# Patient Record
Sex: MALE | Race: WHITE
[De-identification: names, ages, dates, MRNs, and addresses within clinical notes are randomized per-mention and may not be internally consistent; named-entity substitution may affect disease eponyms.]

---

## 2017-01-05 ENCOUNTER — HOSPITAL ENCOUNTER (OUTPATIENT)
Dept: HOSPITAL 53 - M SFHCCAPE | Age: 52
End: 2017-01-05
Attending: PHYSICIAN ASSISTANT
Payer: COMMERCIAL

## 2017-01-05 DIAGNOSIS — J02.9: Primary | ICD-10-CM

## 2017-01-23 ENCOUNTER — HOSPITAL ENCOUNTER (OUTPATIENT)
Dept: HOSPITAL 53 - M SFHCCAPE | Age: 52
End: 2017-01-23
Attending: PHYSICIAN ASSISTANT
Payer: COMMERCIAL
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
## 2017-01-23 DIAGNOSIS — R07.9: Primary | ICD-10-CM

## 2017-01-23 LAB
ALBUMIN SERPL BCG-MCNC: 4.5 GM/DL (ref 3.2–5.2)
ALBUMIN/GLOB SERPL: 1.45 {RATIO} (ref 1–1.93)
ALP SERPL-CCNC: 68 U/L (ref 45–117)
ALT SERPL W P-5'-P-CCNC: 45 U/L (ref 12–78)
ANION GAP SERPL CALC-SCNC: 7 MEQ/L (ref 8–16)
AST SERPL-CCNC: 14 U/L (ref 15–37)
BASOPHILS # BLD AUTO: 0.2 K/MM3 (ref 0–0.2)
BASOPHILS NFR BLD AUTO: 1.6 % (ref 0–1)
BILIRUB SERPL-MCNC: 1 MG/DL (ref 0.2–1)
BUN SERPL-MCNC: 13 MG/DL (ref 7–18)
CALCIUM SERPL-MCNC: 9.4 MG/DL (ref 8.5–10.1)
CHLORIDE SERPL-SCNC: 105 MEQ/L (ref 98–107)
CO2 SERPL-SCNC: 28 MEQ/L (ref 21–32)
CREAT SERPL-MCNC: 1.01 MG/DL (ref 0.7–1.3)
EOSINOPHIL # BLD AUTO: 0.8 K/MM3 (ref 0–0.5)
EOSINOPHIL NFR BLD AUTO: 7.9 % (ref 0–3)
ERYTHROCYTE [DISTWIDTH] IN BLOOD BY AUTOMATED COUNT: 13.4 % (ref 11.5–14.5)
GFR SERPL CREATININE-BSD FRML MDRD: > 60 ML/MIN/{1.73_M2} (ref 56–?)
GLUCOSE SERPL-MCNC: 115 MG/DL (ref 70–105)
LARGE UNSTAINED CELL #: 0.2 K/MM3 (ref 0–0.4)
LARGE UNSTAINED CELL %: 1.9 % (ref 0–4)
LYMPHOCYTES # BLD AUTO: 2.6 K/MM3 (ref 1.5–4.5)
LYMPHOCYTES NFR BLD AUTO: 25.6 % (ref 24–44)
MCH RBC QN AUTO: 32.5 PG (ref 27–33)
MCHC RBC AUTO-ENTMCNC: 35.3 G/DL (ref 32–36.5)
MCV RBC AUTO: 92.2 FL (ref 80–96)
MONOCYTES # BLD AUTO: 0.4 K/MM3 (ref 0–0.8)
MONOCYTES NFR BLD AUTO: 4.5 % (ref 0–5)
NEUTROPHILS # BLD AUTO: 5.6 K/MM3 (ref 1.8–7.7)
NEUTROPHILS NFR BLD AUTO: 58.5 % (ref 36–66)
PLATELET # BLD AUTO: 241 K/MM3 (ref 150–450)
POTASSIUM SERPL-SCNC: 4.4 MEQ/L (ref 3.5–5.1)
PROT SERPL-MCNC: 7.6 GM/DL (ref 6.4–8.2)
SODIUM SERPL-SCNC: 140 MEQ/L (ref 136–145)
WBC # BLD AUTO: 9.5 K/MM3 (ref 4–10)

## 2017-01-26 LAB
B BURGDOR IGG+IGM SER-ACNC: <0.91 ISR (ref 0–0.9)
B BURGDOR IGM SER IA-ACNC: <0.8 INDEX (ref 0–0.79)

## 2017-01-30 ENCOUNTER — HOSPITAL ENCOUNTER (OUTPATIENT)
Dept: HOSPITAL 53 - M SFHCCAPE | Age: 52
End: 2017-01-30
Attending: PHYSICIAN ASSISTANT
Payer: COMMERCIAL

## 2017-01-30 DIAGNOSIS — M25.50: Primary | ICD-10-CM

## 2017-01-30 LAB — MAGNESIUM SERPL-MCNC: 1.9 MG/DL (ref 1.8–2.4)

## 2017-01-31 LAB
CONTROL LINE: (no result)
HIV 1+2 AB+HIV1 P24 AG SERPL QL IA: NEGATIVE
HIV 1+2 AB+HIV1 P24 AG SERPL QL IA: NEGATIVE
VIT B12 SERPL-MCNC: 397 PG/ML (ref 247–911)

## 2017-02-01 ENCOUNTER — HOSPITAL ENCOUNTER (OUTPATIENT)
Dept: HOSPITAL 53 - M SLEEP HO | Age: 52
End: 2017-02-01
Attending: NURSE PRACTITIONER
Payer: COMMERCIAL

## 2017-02-01 DIAGNOSIS — G47.30: Primary | ICD-10-CM

## 2017-02-02 NOTE — SLEEPHOME
DATE OF PROCEDURE:  02/01/2017

 

ORDERED BY:  Deanna Peñaloza NP

 

Diagnostic home sleep testing was performed due to concern for the obstructive

sleep apnea syndrome in this patient with snoring and excessive somnolence.

 

For testing, a NOX-T3 respiratory monitoring device was used.  Continuous record

was made of pulse, oxygen saturation, chest and abdominal strain, airflow and

body position.

 

9 hours and 59 minutes of data were reviewed.  7 hours and 51 minutes were marked

as time in bed.  During the interval marked time in bed, there were 192

respiratory events identified of 10 seconds in duration or greater for a

respiratory event index of 24.4.  The events were primarily obstructive, but

central and mixed apneas were also noted.  The patient's baseline pulse rate was

82 beats per minute.  Pulse rate ranged 67 to 113.  Baseline oxygen saturation

93.8%.  Lowest oxygen saturation 83%.  Testing was performed in both the supine

and non supine positions.

 

IMPRESSION:

Abnormal home sleep testing with repetitive respiratory events and oxygen

desaturations to 83% with a respiratory event index of 24.4, is consistent with

the obstructive sleep apnea syndrome.

 

RECOMMENDATION:  The patient should be referred for a formal sleep evaluation and

in laboratory pressure titration.

 

 

 

 

 

 

cc:    *REBECCA Escalante

## 2017-02-23 ENCOUNTER — HOSPITAL ENCOUNTER (OUTPATIENT)
Dept: HOSPITAL 53 - M SFHCCAPE | Age: 52
End: 2017-02-23
Attending: PHYSICIAN ASSISTANT
Payer: COMMERCIAL

## 2017-02-23 DIAGNOSIS — E11.9: Primary | ICD-10-CM

## 2017-02-23 LAB
ALBUMIN SERPL BCG-MCNC: 4.3 GM/DL (ref 3.2–5.2)
ALBUMIN/GLOB SERPL: 1.48 {RATIO} (ref 1–1.93)
ALP SERPL-CCNC: 71 U/L (ref 45–117)
ALT SERPL W P-5'-P-CCNC: 57 U/L (ref 12–78)
ANION GAP SERPL CALC-SCNC: 10 MEQ/L (ref 8–16)
AST SERPL-CCNC: 20 U/L (ref 15–37)
BILIRUB SERPL-MCNC: 1 MG/DL (ref 0.2–1)
BUN SERPL-MCNC: 14 MG/DL (ref 7–18)
CALCIUM SERPL-MCNC: 9.3 MG/DL (ref 8.5–10.1)
CHLORIDE SERPL-SCNC: 99 MEQ/L (ref 98–107)
CO2 SERPL-SCNC: 28 MEQ/L (ref 21–32)
CREAT SERPL-MCNC: 1.15 MG/DL (ref 0.7–1.3)
EST. AVERAGE GLUCOSE BLD GHB EST-MCNC: 180 MG/DL (ref 60–110)
GFR SERPL CREATININE-BSD FRML MDRD: > 60 ML/MIN/{1.73_M2} (ref 56–?)
GLUCOSE SERPL-MCNC: 221 MG/DL (ref 70–105)
POTASSIUM SERPL-SCNC: 4.5 MEQ/L (ref 3.5–5.1)
PROT SERPL-MCNC: 7.2 GM/DL (ref 6.4–8.2)
SODIUM SERPL-SCNC: 137 MEQ/L (ref 136–145)

## 2017-07-18 ENCOUNTER — HOSPITAL ENCOUNTER (OUTPATIENT)
Dept: HOSPITAL 53 - M SFHCCAPE | Age: 52
End: 2017-07-18
Attending: PHYSICIAN ASSISTANT
Payer: COMMERCIAL

## 2017-07-18 DIAGNOSIS — E55.9: Primary | ICD-10-CM

## 2017-07-18 DIAGNOSIS — E11.9: ICD-10-CM

## 2017-07-18 LAB
ALBUMIN SERPL BCG-MCNC: 4 GM/DL (ref 3.2–5.2)
ALBUMIN/GLOB SERPL: 1.6 {RATIO} (ref 1–1.93)
ALP SERPL-CCNC: 74 U/L (ref 45–117)
ALT SERPL W P-5'-P-CCNC: 44 U/L (ref 12–78)
ANION GAP SERPL CALC-SCNC: 8 MEQ/L (ref 8–16)
AST SERPL-CCNC: 15 U/L (ref 15–37)
BASOPHILS # BLD AUTO: 0 K/MM3 (ref 0–0.2)
BASOPHILS NFR BLD AUTO: 0.9 % (ref 0–1)
BILIRUB SERPL-MCNC: 0.8 MG/DL (ref 0.2–1)
BUN SERPL-MCNC: 21 MG/DL (ref 7–18)
CALCIUM SERPL-MCNC: 8.7 MG/DL (ref 8.5–10.1)
CHLORIDE SERPL-SCNC: 102 MEQ/L (ref 98–107)
CO2 SERPL-SCNC: 26 MEQ/L (ref 21–32)
CREAT SERPL-MCNC: 1.02 MG/DL (ref 0.7–1.3)
EOSINOPHIL # BLD AUTO: 0.2 K/MM3 (ref 0–0.5)
EOSINOPHIL NFR BLD AUTO: 4 % (ref 0–3)
ERYTHROCYTE [DISTWIDTH] IN BLOOD BY AUTOMATED COUNT: 13 % (ref 11.5–14.5)
EST. AVERAGE GLUCOSE BLD GHB EST-MCNC: 209 MG/DL (ref 60–110)
GFR SERPL CREATININE-BSD FRML MDRD: > 60 ML/MIN/{1.73_M2} (ref 56–?)
GLUCOSE SERPL-MCNC: 244 MG/DL (ref 70–105)
LARGE UNSTAINED CELL #: 0.1 K/MM3 (ref 0–0.4)
LARGE UNSTAINED CELL %: 1.5 % (ref 0–4)
LYMPHOCYTES # BLD AUTO: 2 K/MM3 (ref 1.5–4.5)
LYMPHOCYTES NFR BLD AUTO: 34.2 % (ref 24–44)
MCH RBC QN AUTO: 32.2 PG (ref 27–33)
MCHC RBC AUTO-ENTMCNC: 34.4 G/DL (ref 32–36.5)
MCV RBC AUTO: 93.8 FL (ref 80–96)
MONOCYTES # BLD AUTO: 0.3 K/MM3 (ref 0–0.8)
MONOCYTES NFR BLD AUTO: 6 % (ref 0–5)
NEUTROPHILS # BLD AUTO: 2.9 K/MM3 (ref 1.8–7.7)
NEUTROPHILS NFR BLD AUTO: 53.4 % (ref 36–66)
PLATELET # BLD AUTO: 212 K/MM3 (ref 150–450)
POTASSIUM SERPL-SCNC: 4.5 MEQ/L (ref 3.5–5.1)
PROT SERPL-MCNC: 6.5 GM/DL (ref 6.4–8.2)
SODIUM SERPL-SCNC: 136 MEQ/L (ref 136–145)
WBC # BLD AUTO: 5.5 K/MM3 (ref 4–10)

## 2017-10-25 ENCOUNTER — HOSPITAL ENCOUNTER (OUTPATIENT)
Dept: HOSPITAL 53 - M SFHCCAPE | Age: 52
End: 2017-10-25
Attending: PHYSICIAN ASSISTANT
Payer: COMMERCIAL

## 2017-10-25 DIAGNOSIS — E11.9: Primary | ICD-10-CM

## 2017-10-25 DIAGNOSIS — E55.9: ICD-10-CM

## 2017-10-25 LAB
ALBUMIN SERPL BCG-MCNC: 4.1 GM/DL (ref 3.2–5.2)
ALBUMIN/GLOB SERPL: 1.37 {RATIO} (ref 1–1.93)
ALP SERPL-CCNC: 80 U/L (ref 45–117)
ALT SERPL W P-5'-P-CCNC: 52 U/L (ref 12–78)
ANION GAP SERPL CALC-SCNC: 7 MEQ/L (ref 8–16)
AST SERPL-CCNC: 14 U/L (ref 15–37)
BASOPHILS # BLD AUTO: 0.1 10^3/UL (ref 0–0.2)
BASOPHILS NFR BLD AUTO: 1.2 % (ref 0–1)
BILIRUB SERPL-MCNC: 0.6 MG/DL (ref 0.2–1)
BUN SERPL-MCNC: 15 MG/DL (ref 7–18)
CALCIUM SERPL-MCNC: 9 MG/DL (ref 8.5–10.1)
CHLORIDE SERPL-SCNC: 102 MEQ/L (ref 98–107)
CHOLEST SERPL-MCNC: 159 MG/DL (ref ?–200)
CO2 SERPL-SCNC: 29 MEQ/L (ref 21–32)
CREAT SERPL-MCNC: 1.02 MG/DL (ref 0.7–1.3)
EOSINOPHIL # BLD AUTO: 0.3 10^3/UL (ref 0–0.5)
EOSINOPHIL NFR BLD AUTO: 4.3 % (ref 0–3)
ERYTHROCYTE [DISTWIDTH] IN BLOOD BY AUTOMATED COUNT: 11.9 % (ref 11.5–14.5)
EST. AVERAGE GLUCOSE BLD GHB EST-MCNC: 209 MG/DL (ref 60–110)
GFR SERPL CREATININE-BSD FRML MDRD: > 60 ML/MIN/{1.73_M2} (ref 56–?)
GLUCOSE SERPL-MCNC: 246 MG/DL (ref 70–105)
IMM GRANULOCYTES NFR BLD: 0.9 % (ref 0–0)
LYMPHOCYTES # BLD AUTO: 1.6 10^3/UL (ref 1.5–4.5)
LYMPHOCYTES NFR BLD AUTO: 27.5 % (ref 24–44)
MCH RBC QN AUTO: 32 PG (ref 27–33)
MCHC RBC AUTO-ENTMCNC: 34.3 G/DL (ref 32–36.5)
MCV RBC AUTO: 93.3 FL (ref 80–96)
MONOCYTES # BLD AUTO: 0.4 10^3/UL (ref 0–0.8)
MONOCYTES NFR BLD AUTO: 6.6 % (ref 0–5)
NEUTROPHILS # BLD AUTO: 3.4 10^3/UL (ref 1.8–7.7)
NEUTROPHILS NFR BLD AUTO: 59.5 % (ref 36–66)
NRBC BLD AUTO-RTO: 0 % (ref 0–0)
PLATELET # BLD AUTO: 213 10^3/UL (ref 150–450)
POTASSIUM SERPL-SCNC: 4.2 MEQ/L (ref 3.5–5.1)
PROT SERPL-MCNC: 7.1 GM/DL (ref 6.4–8.2)
SODIUM SERPL-SCNC: 138 MEQ/L (ref 136–145)
TRIGL SERPL-MCNC: 350 MG/DL (ref ?–150)
WBC # BLD AUTO: 5.8 10^3/UL (ref 4–10)

## 2018-01-24 ENCOUNTER — HOSPITAL ENCOUNTER (OUTPATIENT)
Dept: HOSPITAL 53 - M SFHCCAPE | Age: 53
End: 2018-01-24
Attending: PHYSICIAN ASSISTANT
Payer: COMMERCIAL

## 2018-01-24 DIAGNOSIS — I10: Primary | ICD-10-CM

## 2018-01-24 DIAGNOSIS — E11.69: ICD-10-CM

## 2018-01-24 DIAGNOSIS — E78.2: ICD-10-CM

## 2018-01-24 LAB
ALBUMIN/GLOBULIN RATIO: 1.54 (ref 1–1.93)
ALBUMIN: 4.3 GM/DL (ref 3.2–5.2)
ALKALINE PHOSPHATASE: 65 U/L (ref 45–117)
ALT SERPL W P-5'-P-CCNC: 45 U/L (ref 12–78)
ANION GAP: 7 MEQ/L (ref 8–16)
AST SERPL-CCNC: 17 U/L (ref 7–37)
BASO #: 0.1 10^3/UL (ref 0–0.2)
BASO %: 1.2 % (ref 0–1)
BILIRUBIN,TOTAL: 0.8 MG/DL (ref 0.2–1)
BLOOD UREA NITROGEN: 15 MG/DL (ref 7–18)
CALCIUM LEVEL: 8.7 MG/DL (ref 8.5–10.1)
CARBON DIOXIDE LEVEL: 28 MEQ/L (ref 21–32)
CHLORIDE LEVEL: 103 MEQ/L (ref 98–107)
CHOLEST SERPL-MCNC: 145 MG/DL (ref ?–200)
CHOLESTEROL RISK RATIO: 4.53 (ref ?–5)
CREATININE FOR GFR: 1.06 MG/DL (ref 0.7–1.3)
EOS #: 0.4 10^3/UL (ref 0–0.5)
EOSINOPHIL NFR BLD AUTO: 6.6 % (ref 0–3)
EST. AVERAGE GLUCOSE BLD GHB EST-MCNC: 220 MG/DL (ref 60–110)
FREE T4: 0.85 NG/DL (ref 0.76–1.46)
GFR SERPL CREATININE-BSD FRML MDRD: > 60 ML/MIN/{1.73_M2} (ref 56–?)
GLUCOSE, FASTING: 286 MG/DL (ref 70–100)
HDLC SERPL-MCNC: 32 MG/DL (ref 40–?)
HEMATOCRIT: 40.7 % (ref 42–52)
HEMOGLOBIN: 13.9 G/DL (ref 14–18)
IMMATURE GRANULOCYTE #: 0 10^3/UL (ref 0–0)
IMMATURE GRANULOCYTE %: 0.3 % (ref 0–0)
LDL CHOLESTEROL: 54.2 MG/DL (ref ?–100)
LYMPH #: 1.6 10^3/UL (ref 1.5–4.5)
LYMPH %: 25.7 % (ref 24–44)
MEAN CORPUSCULAR HEMOGLOBIN: 31.7 PG (ref 27–33)
MEAN CORPUSCULAR HGB CONC: 34.2 G/DL (ref 32–36.5)
MEAN CORPUSCULAR VOLUME: 92.9 FL (ref 80–96)
MONO #: 0.3 10^3/UL (ref 0–0.8)
MONO %: 5.6 % (ref 0–5)
NEUTROPHILS #: 3.7 10^3/UL (ref 1.8–7.7)
NEUTROPHILS %: 60.6 % (ref 36–66)
NONHDLC SERPL-MCNC: 113 MG/DL
NRBC BLD AUTO-RTO: 0 % (ref 0–0)
PLATELET COUNT, AUTOMATED: 231 10^3/UL (ref 150–450)
POTASSIUM SERUM: 4.3 MEQ/L (ref 3.5–5.1)
RED BLOOD COUNT: 4.38 10^6/UL (ref 4.3–6.1)
RED CELL DISTRIBUTION WIDTH: 12.3 % (ref 11.5–14.5)
SODIUM LEVEL: 138 MEQ/L (ref 136–145)
THYROID STIMULATING HORMONE: 1.33 UIU/ML (ref 0.36–3.74)
TOTAL PROTEIN: 7.1 GM/DL (ref 6.4–8.2)
TRIGLYCERIDES LEVEL: 294 MG/DL (ref ?–150)
WHITE BLOOD COUNT: 6 10^3/UL (ref 4–10)

## 2018-01-24 PROCEDURE — 84443 ASSAY THYROID STIM HORMONE: CPT

## 2018-05-30 ENCOUNTER — HOSPITAL ENCOUNTER (OUTPATIENT)
Dept: HOSPITAL 53 - M SFHCCAPE | Age: 53
End: 2018-05-30
Attending: PHYSICIAN ASSISTANT
Payer: COMMERCIAL

## 2018-05-30 DIAGNOSIS — E11.69: Primary | ICD-10-CM

## 2018-05-30 LAB
ALBUMIN/GLOBULIN RATIO: 1.54 (ref 1–1.93)
ALBUMIN: 4.3 GM/DL (ref 3.2–5.2)
ALKALINE PHOSPHATASE: 66 U/L (ref 45–117)
ALT SERPL W P-5'-P-CCNC: 55 U/L (ref 12–78)
ANION GAP: 8 MEQ/L (ref 8–16)
AST SERPL-CCNC: 22 U/L (ref 7–37)
BILIRUBIN,TOTAL: 0.7 MG/DL (ref 0.2–1)
BLOOD UREA NITROGEN: 22 MG/DL (ref 7–18)
CALCIUM LEVEL: 9.1 MG/DL (ref 8.5–10.1)
CARBON DIOXIDE LEVEL: 26 MEQ/L (ref 21–32)
CHLORIDE LEVEL: 104 MEQ/L (ref 98–107)
CREATININE FOR GFR: 1.35 MG/DL (ref 0.7–1.3)
EST. AVERAGE GLUCOSE BLD GHB EST-MCNC: 246 MG/DL (ref 60–110)
GFR SERPL CREATININE-BSD FRML MDRD: 59.1 ML/MIN/{1.73_M2} (ref 56–?)
GLUCOSE, FASTING: 258 MG/DL (ref 70–100)
POTASSIUM SERUM: 4.5 MEQ/L (ref 3.5–5.1)
SODIUM LEVEL: 138 MEQ/L (ref 136–145)
TOTAL PROTEIN: 7.1 GM/DL (ref 6.4–8.2)

## 2018-09-20 ENCOUNTER — HOSPITAL ENCOUNTER (OUTPATIENT)
Dept: HOSPITAL 53 - M SFHCCAPE | Age: 53
End: 2018-09-20
Attending: PHYSICIAN ASSISTANT
Payer: COMMERCIAL

## 2018-09-20 DIAGNOSIS — I10: Primary | ICD-10-CM

## 2018-09-20 DIAGNOSIS — E55.9: ICD-10-CM

## 2018-09-20 DIAGNOSIS — E11.65: ICD-10-CM

## 2018-09-20 DIAGNOSIS — Z12.5: ICD-10-CM

## 2018-09-20 LAB
25(OH)D3 SERPL-MCNC: 48.8 NG/ML (ref 30–100)
ALBUMIN/GLOBULIN RATIO: 1.44 (ref 1–1.93)
ALBUMIN: 4.6 GM/DL (ref 3.2–5.2)
ALKALINE PHOSPHATASE: 69 U/L (ref 45–117)
ALT SERPL W P-5'-P-CCNC: 42 U/L (ref 12–78)
ANION GAP: 9 MEQ/L (ref 8–16)
AST SERPL-CCNC: 18 U/L (ref 7–37)
BASO #: 0.1 10^3/UL (ref 0–0.2)
BASO %: 1.1 % (ref 0–1)
BILIRUBIN,TOTAL: 0.8 MG/DL (ref 0.2–1)
BLOOD UREA NITROGEN: 20 MG/DL (ref 7–18)
CALCIUM LEVEL: 9.6 MG/DL (ref 8.5–10.1)
CARBON DIOXIDE LEVEL: 26 MEQ/L (ref 21–32)
CHLORIDE LEVEL: 102 MEQ/L (ref 98–107)
CHOLEST SERPL-MCNC: 182 MG/DL (ref ?–200)
CHOLESTEROL RISK RATIO: 4.67 (ref ?–5)
CREAT UR-MCNC: 205 MG/DL
CREATININE FOR GFR: 1.1 MG/DL (ref 0.7–1.3)
EOS #: 0.2 10^3/UL (ref 0–0.5)
EOSINOPHIL NFR BLD AUTO: 3.4 % (ref 0–3)
EST. AVERAGE GLUCOSE BLD GHB EST-MCNC: 189 MG/DL (ref 60–110)
GFR SERPL CREATININE-BSD FRML MDRD: > 60 ML/MIN/{1.73_M2} (ref 56–?)
GLUCOSE, FASTING: 249 MG/DL (ref 70–100)
HDLC SERPL-MCNC: 39 MG/DL (ref 40–?)
HEMATOCRIT: 44.3 % (ref 42–52)
HEMOGLOBIN: 14.9 G/DL (ref 13.5–17.5)
IMMATURE GRANULOCYTE %: 0.5 % (ref 0–3)
LDL CHOLESTEROL: 92 MG/DL (ref ?–100)
LYMPH #: 1.6 10^3/UL (ref 1.5–4.5)
LYMPH %: 26 % (ref 24–44)
MEAN CORPUSCULAR HEMOGLOBIN: 31.6 PG (ref 27–33)
MEAN CORPUSCULAR HGB CONC: 33.6 G/DL (ref 32–36.5)
MEAN CORPUSCULAR VOLUME: 93.9 FL (ref 80–96)
MICROALBUMIN UR-MCNC: 44.6 MG/L
MICROALBUMIN/CREAT UR: 21.8 MCG/MG (ref 0–30)
MONO #: 0.4 10^3/UL (ref 0–0.8)
MONO %: 6.4 % (ref 0–5)
NEUTROPHILS #: 3.8 10^3/UL (ref 1.8–7.7)
NEUTROPHILS %: 62.6 % (ref 36–66)
NONHDLC SERPL-MCNC: 143 MG/DL
NRBC BLD AUTO-RTO: 0 % (ref 0–0)
PLATELET COUNT, AUTOMATED: 232 10^3/UL (ref 150–450)
POTASSIUM SERUM: 4.7 MEQ/L (ref 3.5–5.1)
PSA SERPL-MCNC: 0.44 NG/ML (ref ?–4)
RED BLOOD COUNT: 4.72 10^6/UL (ref 4.3–6.1)
RED CELL DISTRIBUTION WIDTH: 11.9 % (ref 11.5–14.5)
SODIUM LEVEL: 137 MEQ/L (ref 136–145)
TOTAL PROTEIN: 7.8 GM/DL (ref 6.4–8.2)
TRIGLYCERIDES LEVEL: 253 MG/DL (ref ?–150)
WHITE BLOOD COUNT: 6.1 10^3/UL (ref 4–10)

## 2018-11-12 ENCOUNTER — HOSPITAL ENCOUNTER (OUTPATIENT)
Dept: HOSPITAL 53 - M LAB REF | Age: 53
End: 2018-11-12
Attending: DERMATOLOGY
Payer: COMMERCIAL

## 2018-11-12 DIAGNOSIS — C44.02: Primary | ICD-10-CM

## 2018-11-12 PROCEDURE — 88305 TISSUE EXAM BY PATHOLOGIST: CPT

## 2018-12-11 ENCOUNTER — HOSPITAL ENCOUNTER (OUTPATIENT)
Dept: HOSPITAL 53 - M SFHCCAPE | Age: 53
End: 2018-12-11
Attending: PHYSICIAN ASSISTANT
Payer: COMMERCIAL

## 2018-12-11 DIAGNOSIS — E11.9: ICD-10-CM

## 2018-12-11 DIAGNOSIS — I10: Primary | ICD-10-CM

## 2018-12-11 LAB
ALBUMIN/GLOBULIN RATIO: 1.39 (ref 1–1.93)
ALBUMIN: 4.3 GM/DL (ref 3.2–5.2)
ALKALINE PHOSPHATASE: 79 U/L (ref 45–117)
ALT SERPL W P-5'-P-CCNC: 44 U/L (ref 12–78)
ANION GAP: 9 MEQ/L (ref 8–16)
AST SERPL-CCNC: 19 U/L (ref 7–37)
BASO #: 0.1 10^3/UL (ref 0–0.2)
BASO %: 1.3 % (ref 0–1)
BILIRUBIN,TOTAL: 1.2 MG/DL (ref 0.2–1)
BLOOD UREA NITROGEN: 18 MG/DL (ref 7–18)
CALCIUM LEVEL: 9.2 MG/DL (ref 8.5–10.1)
CARBON DIOXIDE LEVEL: 27 MEQ/L (ref 21–32)
CHLORIDE LEVEL: 100 MEQ/L (ref 98–107)
CREATININE FOR GFR: 1.11 MG/DL (ref 0.7–1.3)
EOS #: 0.6 10^3/UL (ref 0–0.5)
EOSINOPHIL NFR BLD AUTO: 8.3 % (ref 0–3)
EST. AVERAGE GLUCOSE BLD GHB EST-MCNC: 232 MG/DL (ref 60–110)
GFR SERPL CREATININE-BSD FRML MDRD: > 60 ML/MIN/{1.73_M2} (ref 56–?)
GLUCOSE, FASTING: 199 MG/DL (ref 70–100)
HEMATOCRIT: 43.8 % (ref 42–52)
HEMOGLOBIN: 14.8 G/DL (ref 13.5–17.5)
IMMATURE GRANULOCYTE %: 0.3 % (ref 0–3)
LYMPH #: 2.2 10^3/UL (ref 1.5–4.5)
LYMPH %: 28.5 % (ref 24–44)
MEAN CORPUSCULAR HEMOGLOBIN: 31.8 PG (ref 27–33)
MEAN CORPUSCULAR HGB CONC: 33.8 G/DL (ref 32–36.5)
MEAN CORPUSCULAR VOLUME: 94 FL (ref 80–96)
MONO #: 0.5 10^3/UL (ref 0–0.8)
MONO %: 6.5 % (ref 0–5)
NEUTROPHILS #: 4.3 10^3/UL (ref 1.8–7.7)
NEUTROPHILS %: 55.1 % (ref 36–66)
NRBC BLD AUTO-RTO: 0 % (ref 0–0)
PLATELET COUNT, AUTOMATED: 251 10^3/UL (ref 150–450)
POTASSIUM SERUM: 4.4 MEQ/L (ref 3.5–5.1)
RED BLOOD COUNT: 4.66 10^6/UL (ref 4.3–6.1)
RED CELL DISTRIBUTION WIDTH: 12.3 % (ref 11.5–14.5)
SODIUM LEVEL: 136 MEQ/L (ref 136–145)
THYROID STIMULATING HORMONE: 2.04 UIU/ML (ref 0.36–3.74)
TOTAL PROTEIN: 7.4 GM/DL (ref 6.4–8.2)
WHITE BLOOD COUNT: 7.7 10^3/UL (ref 4–10)

## 2018-12-12 ENCOUNTER — HOSPITAL ENCOUNTER (OUTPATIENT)
Dept: HOSPITAL 53 - M RAD | Age: 53
End: 2018-12-12
Attending: PHYSICIAN ASSISTANT
Payer: COMMERCIAL

## 2018-12-12 DIAGNOSIS — I10: ICD-10-CM

## 2018-12-12 DIAGNOSIS — M51.34: ICD-10-CM

## 2018-12-12 DIAGNOSIS — Z01.818: Primary | ICD-10-CM

## 2018-12-12 DIAGNOSIS — M25.731: ICD-10-CM

## 2018-12-12 DIAGNOSIS — C44.92: ICD-10-CM

## 2018-12-12 DIAGNOSIS — M25.531: ICD-10-CM

## 2018-12-12 PROCEDURE — 71046 X-RAY EXAM CHEST 2 VIEWS: CPT

## 2018-12-18 RX ADMIN — Medication 1 EA: at 11:29

## 2018-12-19 ENCOUNTER — HOSPITAL ENCOUNTER (OUTPATIENT)
Dept: HOSPITAL 53 - M SDC | Age: 53
Discharge: HOME | End: 2018-12-19
Attending: PLASTIC SURGERY
Payer: COMMERCIAL

## 2018-12-19 DIAGNOSIS — Z79.84: ICD-10-CM

## 2018-12-19 DIAGNOSIS — Z79.4: ICD-10-CM

## 2018-12-19 DIAGNOSIS — Z88.8: ICD-10-CM

## 2018-12-19 DIAGNOSIS — G47.33: ICD-10-CM

## 2018-12-19 DIAGNOSIS — E78.00: ICD-10-CM

## 2018-12-19 DIAGNOSIS — I10: ICD-10-CM

## 2018-12-19 DIAGNOSIS — M12.9: ICD-10-CM

## 2018-12-19 DIAGNOSIS — Z79.899: ICD-10-CM

## 2018-12-19 DIAGNOSIS — E11.9: ICD-10-CM

## 2018-12-19 DIAGNOSIS — R06.83: ICD-10-CM

## 2018-12-19 DIAGNOSIS — Z79.82: ICD-10-CM

## 2018-12-19 DIAGNOSIS — F41.9: ICD-10-CM

## 2018-12-19 DIAGNOSIS — C44.329: Primary | ICD-10-CM

## 2018-12-19 LAB — GLUCOSE BLDC GLUCOMTR-MCNC: 312 MG/DL (ref 70–105)

## 2018-12-19 PROCEDURE — 11642 EXC F/E/E/N/L MAL+MRG 1.1-2: CPT

## 2018-12-19 RX ADMIN — CEFAZOLIN SODIUM 1 GM: 1 INJECTION, POWDER, FOR SOLUTION INTRAMUSCULAR; INTRAVENOUS at 12:07

## 2018-12-19 RX ADMIN — BACITRACIN 1 DOSE: 500 OINTMENT TOPICAL at 12:06

## 2018-12-19 RX ADMIN — INSULIN LISPRO 10 UNITS: 100 INJECTION, SOLUTION INTRAVENOUS; SUBCUTANEOUS at 11:03

## 2018-12-19 RX ADMIN — CEFAZOLIN SODIUM 1 MLS/HR: 1 INJECTION, POWDER, FOR SOLUTION INTRAMUSCULAR; INTRAVENOUS at 11:28

## 2018-12-19 RX ADMIN — ACETAMINOPHEN 1 MG: 325 TABLET ORAL at 13:10

## 2018-12-19 RX ADMIN — SODIUM CHLORIDE, POTASSIUM CHLORIDE, SODIUM LACTATE AND CALCIUM CHLORIDE 1 MLS/HR: 600; 310; 30; 20 INJECTION, SOLUTION INTRAVENOUS at 10:30

## 2018-12-19 RX ADMIN — LIDOCAINE HYDROCHLORIDE AND EPINEPHRINE 1 ML: 20; 5 INJECTION, SOLUTION EPIDURAL; INFILTRATION; INTRACAUDAL; PERINEURAL at 11:49

## 2019-02-23 ENCOUNTER — HOSPITAL ENCOUNTER (OUTPATIENT)
Dept: HOSPITAL 53 - M SLEEP | Age: 54
End: 2019-02-23
Attending: SPECIALIST
Payer: COMMERCIAL

## 2019-02-23 DIAGNOSIS — G47.33: Primary | ICD-10-CM

## 2019-02-25 NOTE — SLEEPCENT
DATE OF PROCEDURE:  02/23/2019

 

ORDERED BY:  Dr. Doyle

 

Nocturnal polysomnography was performed for the titration of pressure therapy in

this patient with a clinical diagnosis of obstructive sleep apnea syndrome

confirmed by home testing revealing a respiratory event index of 24.4.

 

For testing, a [employer] [place] full face mask of medium size was used, 5

cm of water pressure were applied to the circuit and the lights were

extinguished.

 

7 hours and 35 minutes of data were reviewed.  There were 357 minutes of sleep

identified.  Sleep latency was normal at 10.5 minutes.  REM latency was mildly

delayed at 108 minutes.  Sleep architecture was fair to good with three REM

cycles noted.  Overall sleep efficiency was 80%.  The patient's electrocardiogram

showed a sinus rhythm with an average heart rate of 75 beats per minute.  EEG

showed mild coarsening in background.  No focal events and normal waveforms for

awake and sleep.  Respiratory events were fully palliated with continuous

positive airway pressure (CPAP) at a pressure of +7.  There was some minor limb

activity noted.  Limb movement arousal index of 3.9.

 

IMPRESSION:

Obstructive sleep apnea syndrome (G47.33).

 

RECOMMENDATIONS:

Nightly use of pressure therapy 7 cm of water.

## 2019-04-25 ENCOUNTER — HOSPITAL ENCOUNTER (OUTPATIENT)
Dept: HOSPITAL 53 - M SFHCCAPE | Age: 54
End: 2019-04-25
Attending: PHYSICIAN ASSISTANT
Payer: COMMERCIAL

## 2019-04-25 DIAGNOSIS — E11.9: Primary | ICD-10-CM

## 2019-04-25 LAB
ALBUMIN SERPL BCG-MCNC: 4.4 GM/DL (ref 3.2–5.2)
ALT SERPL W P-5'-P-CCNC: 44 U/L (ref 12–78)
BASOPHILS # BLD AUTO: 0.1 10^3/UL (ref 0–0.2)
BASOPHILS NFR BLD AUTO: 1.1 % (ref 0–1)
BILIRUB SERPL-MCNC: 1.1 MG/DL (ref 0.2–1)
BUN SERPL-MCNC: 19 MG/DL (ref 7–18)
CALCIUM SERPL-MCNC: 9.1 MG/DL (ref 8.5–10.1)
CHLORIDE SERPL-SCNC: 102 MEQ/L (ref 98–107)
CHOLEST SERPL-MCNC: 142 MG/DL (ref ?–200)
CHOLEST/HDLC SERPL: 4.44 {RATIO} (ref ?–5)
CO2 SERPL-SCNC: 28 MEQ/L (ref 21–32)
CREAT SERPL-MCNC: 1.15 MG/DL (ref 0.7–1.3)
EOSINOPHIL # BLD AUTO: 0.3 10^3/UL (ref 0–0.5)
EOSINOPHIL NFR BLD AUTO: 4.6 % (ref 0–3)
EST. AVERAGE GLUCOSE BLD GHB EST-MCNC: 212 MG/DL (ref 60–110)
GFR SERPL CREATININE-BSD FRML MDRD: > 60 ML/MIN/{1.73_M2} (ref 56–?)
GLUCOSE SERPL-MCNC: 236 MG/DL (ref 70–100)
HCT VFR BLD AUTO: 42.7 % (ref 42–52)
HDLC SERPL-MCNC: 32 MG/DL (ref 40–?)
HGB BLD-MCNC: 14.2 G/DL (ref 13.5–17.5)
LDLC SERPL CALC-MCNC: 42.4 MG/DL (ref ?–100)
LYMPHOCYTES # BLD AUTO: 1.8 10^3/UL (ref 1.5–4.5)
LYMPHOCYTES NFR BLD AUTO: 28.3 % (ref 24–44)
MCH RBC QN AUTO: 30.9 PG (ref 27–33)
MCHC RBC AUTO-ENTMCNC: 33.3 G/DL (ref 32–36.5)
MCV RBC AUTO: 92.8 FL (ref 80–96)
MONOCYTES # BLD AUTO: 0.5 10^3/UL (ref 0–0.8)
MONOCYTES NFR BLD AUTO: 7.8 % (ref 0–5)
NEUTROPHILS # BLD AUTO: 3.6 10^3/UL (ref 1.8–7.7)
NEUTROPHILS NFR BLD AUTO: 57.6 % (ref 36–66)
NONHDLC SERPL-MCNC: 110 MG/DL
PLATELET # BLD AUTO: 231 10^3/UL (ref 150–450)
POTASSIUM SERPL-SCNC: 4.2 MEQ/L (ref 3.5–5.1)
PROT SERPL-MCNC: 6.8 GM/DL (ref 6.4–8.2)
RBC # BLD AUTO: 4.6 10^6/UL (ref 4.3–6.1)
SODIUM SERPL-SCNC: 137 MEQ/L (ref 136–145)
TRIGL SERPL-MCNC: 338 MG/DL (ref ?–150)
WBC # BLD AUTO: 6.3 10^3/UL (ref 4–10)

## 2019-04-29 ENCOUNTER — HOSPITAL ENCOUNTER (OUTPATIENT)
Dept: HOSPITAL 53 - M RAD | Age: 54
End: 2019-04-29
Attending: PHYSICIAN ASSISTANT
Payer: COMMERCIAL

## 2019-04-29 DIAGNOSIS — K59.00: Primary | ICD-10-CM

## 2019-04-29 DIAGNOSIS — R10.31: ICD-10-CM

## 2019-04-29 DIAGNOSIS — N28.1: ICD-10-CM

## 2019-04-29 DIAGNOSIS — K57.90: ICD-10-CM

## 2019-04-29 DIAGNOSIS — K76.0: ICD-10-CM

## 2019-04-29 PROCEDURE — 74177 CT ABD & PELVIS W/CONTRAST: CPT

## 2019-04-30 NOTE — REP
Clinical:  Constipation.

 

Technique:  Axial contrast enhanced images from the lung bases to the pubic

symphysis using oral (per protocol) and 100 ml Isovue 370 intravenous contrast

material with coronal and sagittal re-formations.

 

Comparison:  12/23/2014.

 

Findings:

Lung bases are clear.  Visualized heart and pericardium normal.

 

Mild fatty infiltration to the liver suggested without focal hepatic lesion.

Spleen, pancreas, gallbladder, bilateral adrenal glands and kidneys are normal /

stable.  1.7 cm left renal cyst slightly increased from prior examination.  The

enteric system including stomach, small, and large bowel is without obstruction

or acute inflammatory process.  Normal terminal ileum and appendix identified in

the right lower quadrant.  Few scattered colonic diverticula noted without acute

diverticulitis.  Pelvis demonstrates normal bladder and age appropriate

prostate/seminal vesicles.  Small fat containing inguinal hernia identified.  No

ascites.  No free air.  No adenopathy.  Abdominal aorta without aneurysm or

dissection.  Musculoskeletal structures without focal osseous abnormality.

 

Impression:

1.  Complex appearing 1.7 cm left renal cyst mildly increased from prior

examination may warrant ultrasound follow-up.

2.  Mild hepatosteatosis.

3.  Few scattered colonic diverticula without acute diverticulitis.

4.  No further acute abdominopelvic pathology appreciated.

 

 

Electronically Signed by

Clemente Trinidad MD 04/30/2019 05:49 A

## 2019-06-10 ENCOUNTER — HOSPITAL ENCOUNTER (EMERGENCY)
Dept: HOSPITAL 53 - M ED | Age: 54
Discharge: LEFT BEFORE DISCHARGE | End: 2019-06-10
Payer: COMMERCIAL

## 2019-06-10 VITALS — BODY MASS INDEX: 34.22 KG/M2 | HEIGHT: 64 IN | WEIGHT: 200.42 LBS

## 2019-06-10 VITALS — DIASTOLIC BLOOD PRESSURE: 74 MMHG | SYSTOLIC BLOOD PRESSURE: 146 MMHG

## 2019-06-10 DIAGNOSIS — Z53.29: Primary | ICD-10-CM

## 2019-09-04 ENCOUNTER — HOSPITAL ENCOUNTER (OUTPATIENT)
Dept: HOSPITAL 53 - M SFHCCAPE | Age: 54
End: 2019-09-04
Attending: PHYSICIAN ASSISTANT
Payer: COMMERCIAL

## 2019-09-04 DIAGNOSIS — I10: ICD-10-CM

## 2019-09-04 DIAGNOSIS — E55.9: ICD-10-CM

## 2019-09-04 DIAGNOSIS — E11.69: ICD-10-CM

## 2019-09-04 DIAGNOSIS — E78.2: Primary | ICD-10-CM

## 2019-09-04 LAB
25(OH)D3 SERPL-MCNC: 40.1 NG/ML (ref 30–100)
ALBUMIN SERPL BCG-MCNC: 3.9 GM/DL (ref 3.2–5.2)
ALT SERPL W P-5'-P-CCNC: 50 U/L (ref 12–78)
BASOPHILS # BLD AUTO: 0.1 10^3/UL (ref 0–0.2)
BASOPHILS NFR BLD AUTO: 0.8 % (ref 0–1)
BILIRUB SERPL-MCNC: 1 MG/DL (ref 0.2–1)
BUN SERPL-MCNC: 20 MG/DL (ref 7–18)
CALCIUM SERPL-MCNC: 9 MG/DL (ref 8.5–10.1)
CHLORIDE SERPL-SCNC: 100 MEQ/L (ref 98–107)
CHOLEST SERPL-MCNC: 141 MG/DL (ref ?–200)
CHOLEST/HDLC SERPL: 4.7 {RATIO} (ref ?–5)
CO2 SERPL-SCNC: 29 MEQ/L (ref 21–32)
CREAT SERPL-MCNC: 1.07 MG/DL (ref 0.7–1.3)
CREAT UR-MCNC: 57.1 MG/DL
EOSINOPHIL # BLD AUTO: 0.2 10^3/UL (ref 0–0.5)
EOSINOPHIL NFR BLD AUTO: 3 % (ref 0–3)
EST. AVERAGE GLUCOSE BLD GHB EST-MCNC: 283 MG/DL (ref 60–110)
GFR SERPL CREATININE-BSD FRML MDRD: > 60 ML/MIN/{1.73_M2} (ref 56–?)
GLUCOSE SERPL-MCNC: 312 MG/DL (ref 70–100)
HCT VFR BLD AUTO: 42.8 % (ref 42–52)
HDLC SERPL-MCNC: 30 MG/DL (ref 40–?)
HGB BLD-MCNC: 14.8 G/DL (ref 13.5–17.5)
LYMPHOCYTES # BLD AUTO: 1.8 10^3/UL (ref 1.5–5)
LYMPHOCYTES NFR BLD AUTO: 29.1 % (ref 24–44)
MCH RBC QN AUTO: 32.5 PG (ref 27–33)
MCHC RBC AUTO-ENTMCNC: 34.6 G/DL (ref 32–36.5)
MCV RBC AUTO: 94.1 FL (ref 80–96)
MICROALBUMIN UR-MCNC: 5.7 MG/L
MICROALBUMIN/CREAT UR: 9.9 MCG/MG (ref 0–30)
MONOCYTES # BLD AUTO: 0.5 10^3/UL (ref 0–0.8)
MONOCYTES NFR BLD AUTO: 7.7 % (ref 0–5)
NEUTROPHILS # BLD AUTO: 3.6 10^3/UL (ref 1.5–8.5)
NEUTROPHILS NFR BLD AUTO: 58.9 % (ref 36–66)
NONHDLC SERPL-MCNC: 111 MG/DL
PLATELET # BLD AUTO: 214 10^3/UL (ref 150–450)
POTASSIUM SERPL-SCNC: 4.4 MEQ/L (ref 3.5–5.1)
PROT SERPL-MCNC: 6.5 GM/DL (ref 6.4–8.2)
RBC # BLD AUTO: 4.55 10^6/UL (ref 4.3–6.1)
SODIUM SERPL-SCNC: 135 MEQ/L (ref 136–145)
TRIGL SERPL-MCNC: 561 MG/DL (ref ?–150)
WBC # BLD AUTO: 6.1 10^3/UL (ref 4–10)

## 2019-11-13 ENCOUNTER — HOSPITAL ENCOUNTER (OUTPATIENT)
Dept: HOSPITAL 53 - M SFHCCAPE | Age: 54
End: 2019-11-13
Attending: PHYSICIAN ASSISTANT
Payer: COMMERCIAL

## 2019-11-13 DIAGNOSIS — M25.50: Primary | ICD-10-CM

## 2019-11-13 LAB
25(OH)D3 SERPL-MCNC: 62.8 NG/ML (ref 30–100)
ALBUMIN SERPL BCG-MCNC: 4.5 GM/DL (ref 3.2–5.2)
ALT SERPL W P-5'-P-CCNC: 43 U/L (ref 12–78)
APPEARANCE UR: CLEAR
BACTERIA UR QL AUTO: NEGATIVE
BASOPHILS # BLD AUTO: 0.1 10^3/UL (ref 0–0.2)
BASOPHILS NFR BLD AUTO: 0.9 % (ref 0–1)
BILIRUB SERPL-MCNC: 1.7 MG/DL (ref 0.2–1)
BILIRUB UR QL STRIP.AUTO: NEGATIVE
BUN SERPL-MCNC: 21 MG/DL (ref 7–18)
CALCIUM SERPL-MCNC: 9.3 MG/DL (ref 8.5–10.1)
CHLORIDE SERPL-SCNC: 106 MEQ/L (ref 98–107)
CO2 SERPL-SCNC: 22 MEQ/L (ref 21–32)
CREAT SERPL-MCNC: 1.12 MG/DL (ref 0.7–1.3)
CRP SERPL-MCNC: < 0.3 MG/DL (ref 0–0.3)
EOSINOPHIL # BLD AUTO: 0.3 10^3/UL (ref 0–0.5)
EOSINOPHIL NFR BLD AUTO: 3.1 % (ref 0–3)
ERYTHROCYTE [SEDIMENTATION RATE] IN BLOOD BY WESTERGREN METHOD: 5 MM/HR (ref 0–20)
FOLATE SERPL-MCNC: 14.2 NG/ML
GFR SERPL CREATININE-BSD FRML MDRD: > 60 ML/MIN/{1.73_M2} (ref 56–?)
GLUCOSE SERPL-MCNC: 163 MG/DL (ref 70–100)
GLUCOSE UR QL STRIP.AUTO: (no result) MG/DL
HCT VFR BLD AUTO: 46.9 % (ref 42–52)
HGB BLD-MCNC: 15.8 G/DL (ref 13.5–17.5)
HGB UR QL STRIP.AUTO: NEGATIVE
KETONES UR QL STRIP.AUTO: NEGATIVE MG/DL
LEUKOCYTE ESTERASE UR QL STRIP.AUTO: NEGATIVE
LYMPHOCYTES # BLD AUTO: 2.5 10^3/UL (ref 1.5–5)
LYMPHOCYTES NFR BLD AUTO: 31.8 % (ref 24–44)
MCH RBC QN AUTO: 31.3 PG (ref 27–33)
MCHC RBC AUTO-ENTMCNC: 33.7 G/DL (ref 32–36.5)
MCV RBC AUTO: 93.1 FL (ref 80–96)
MONOCYTES # BLD AUTO: 0.5 10^3/UL (ref 0–0.8)
MONOCYTES NFR BLD AUTO: 6.5 % (ref 0–5)
MUCOUS THREADS URNS QL MICRO: (no result)
NEUTROPHILS # BLD AUTO: 4.6 10^3/UL (ref 1.5–8.5)
NEUTROPHILS NFR BLD AUTO: 57.4 % (ref 36–66)
NITRITE UR QL STRIP.AUTO: NEGATIVE
PH UR STRIP.AUTO: 5 UNITS (ref 5–9)
PLATELET # BLD AUTO: 272 10^3/UL (ref 150–450)
POTASSIUM SERPL-SCNC: 4.8 MEQ/L (ref 3.5–5.1)
PROT SERPL-MCNC: 7.8 GM/DL (ref 6.4–8.2)
PROT UR QL STRIP.AUTO: NEGATIVE MG/DL
RBC # BLD AUTO: 5.04 10^6/UL (ref 4.3–6.1)
RBC # UR AUTO: 0 /HPF (ref 0–3)
RHEUMATOID FACT SERPL-ACNC: < 10 IU/ML (ref ?–15)
SODIUM SERPL-SCNC: 137 MEQ/L (ref 136–145)
SP GR UR STRIP.AUTO: 1.01 (ref 1–1.03)
SQUAMOUS #/AREA URNS AUTO: 0 /HPF (ref 0–6)
TSH SERPL DL<=0.005 MIU/L-ACNC: 3.43 UIU/ML (ref 0.36–3.74)
UROBILINOGEN UR QL STRIP.AUTO: 0.2 MG/DL (ref 0–2)
VIT B12 SERPL-MCNC: 550 PG/ML
WBC # BLD AUTO: 8 10^3/UL (ref 4–10)
WBC #/AREA URNS AUTO: 0 /HPF (ref 0–3)

## 2019-11-16 LAB
B BURGDOR IGG+IGM SER-ACNC: <0.91 ISR (ref 0–0.9)
B BURGDOR IGM SER IA-ACNC: <0.8 INDEX (ref 0–0.79)
CCP IGG SERPL-ACNC: 9 UNITS (ref 0–19)

## 2020-01-08 ENCOUNTER — HOSPITAL ENCOUNTER (OUTPATIENT)
Dept: HOSPITAL 53 - M SFHCCAPE | Age: 55
End: 2020-01-08
Attending: PHYSICIAN ASSISTANT
Payer: COMMERCIAL

## 2020-01-08 DIAGNOSIS — E55.9: ICD-10-CM

## 2020-01-08 DIAGNOSIS — E11.69: Primary | ICD-10-CM

## 2020-01-08 LAB
25(OH)D3 SERPL-MCNC: 42.8 NG/ML (ref 30–100)
ALBUMIN SERPL BCG-MCNC: 3.8 GM/DL (ref 3.2–5.2)
ALT SERPL W P-5'-P-CCNC: 26 U/L (ref 12–78)
BASOPHILS # BLD AUTO: 0.1 10^3/UL (ref 0–0.2)
BASOPHILS NFR BLD AUTO: 1.1 % (ref 0–1)
BILIRUB SERPL-MCNC: 0.8 MG/DL (ref 0.2–1)
BUN SERPL-MCNC: 21 MG/DL (ref 7–18)
CALCIUM SERPL-MCNC: 8.6 MG/DL (ref 8.5–10.1)
CHLORIDE SERPL-SCNC: 102 MEQ/L (ref 98–107)
CHOLEST SERPL-MCNC: 157 MG/DL (ref ?–200)
CHOLEST/HDLC SERPL: 5.61 {RATIO} (ref ?–5)
CO2 SERPL-SCNC: 26 MEQ/L (ref 21–32)
CREAT SERPL-MCNC: 1.15 MG/DL (ref 0.7–1.3)
EOSINOPHIL # BLD AUTO: 0.6 10^3/UL (ref 0–0.5)
EOSINOPHIL NFR BLD AUTO: 9.5 % (ref 0–3)
EST. AVERAGE GLUCOSE BLD GHB EST-MCNC: 237 MG/DL (ref 60–110)
GFR SERPL CREATININE-BSD FRML MDRD: > 60 ML/MIN/{1.73_M2} (ref 56–?)
GLUCOSE SERPL-MCNC: 272 MG/DL (ref 70–100)
HCT VFR BLD AUTO: 39.8 % (ref 42–52)
HDLC SERPL-MCNC: 28 MG/DL (ref 40–?)
HGB BLD-MCNC: 13.6 G/DL (ref 13.5–17.5)
LYMPHOCYTES # BLD AUTO: 2.2 10^3/UL (ref 1.5–5)
LYMPHOCYTES NFR BLD AUTO: 34.4 % (ref 24–44)
MCH RBC QN AUTO: 32 PG (ref 27–33)
MCHC RBC AUTO-ENTMCNC: 34.2 G/DL (ref 32–36.5)
MCV RBC AUTO: 93.6 FL (ref 80–96)
MONOCYTES # BLD AUTO: 0.5 10^3/UL (ref 0–0.8)
MONOCYTES NFR BLD AUTO: 7.1 % (ref 0–5)
NEUTROPHILS # BLD AUTO: 3 10^3/UL (ref 1.5–8.5)
NEUTROPHILS NFR BLD AUTO: 47.4 % (ref 36–66)
NONHDLC SERPL-MCNC: 129 MG/DL
PLATELET # BLD AUTO: 191 10^3/UL (ref 150–450)
POTASSIUM SERPL-SCNC: 4.6 MEQ/L (ref 3.5–5.1)
PROT SERPL-MCNC: 6.4 GM/DL (ref 6.4–8.2)
RBC # BLD AUTO: 4.25 10^6/UL (ref 4.3–6.1)
SODIUM SERPL-SCNC: 137 MEQ/L (ref 136–145)
TRIGL SERPL-MCNC: 448 MG/DL (ref ?–150)
TSH SERPL DL<=0.005 MIU/L-ACNC: 2.9 UIU/ML (ref 0.36–3.74)
WBC # BLD AUTO: 6.3 10^3/UL (ref 4–10)

## 2020-02-25 ENCOUNTER — HOSPITAL ENCOUNTER (OUTPATIENT)
Dept: HOSPITAL 53 - M SFHCCAPE | Age: 55
End: 2020-02-25
Attending: PHYSICIAN ASSISTANT
Payer: COMMERCIAL

## 2020-02-25 DIAGNOSIS — R07.81: Primary | ICD-10-CM

## 2020-02-25 LAB
APPEARANCE UR: CLEAR
BACTERIA UR QL AUTO: NEGATIVE
BILIRUB UR QL STRIP.AUTO: NEGATIVE
GLUCOSE UR QL STRIP.AUTO: (no result) MG/DL
HGB UR QL STRIP.AUTO: NEGATIVE
KETONES UR QL STRIP.AUTO: NEGATIVE MG/DL
LEUKOCYTE ESTERASE UR QL STRIP.AUTO: NEGATIVE
NITRITE UR QL STRIP.AUTO: NEGATIVE
PH UR STRIP.AUTO: 6 UNITS (ref 5–9)
PROT UR QL STRIP.AUTO: NEGATIVE MG/DL
RBC # UR AUTO: 1 /HPF (ref 0–3)
SP GR UR STRIP.AUTO: 1.02 (ref 1–1.03)
SQUAMOUS #/AREA URNS AUTO: 0 /HPF (ref 0–6)
UROBILINOGEN UR QL STRIP.AUTO: 0.2 MG/DL (ref 0–2)
WBC #/AREA URNS AUTO: 0 /HPF (ref 0–3)

## 2020-03-24 ENCOUNTER — HOSPITAL ENCOUNTER (OUTPATIENT)
Dept: HOSPITAL 53 - M CLY | Age: 55
End: 2020-03-24
Attending: PHYSICIAN ASSISTANT
Payer: COMMERCIAL
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
## 2020-03-24 DIAGNOSIS — S69.91XA: Primary | ICD-10-CM

## 2020-03-24 DIAGNOSIS — M79.89: ICD-10-CM
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
## 2020-03-24 NOTE — REP
Clinical:  Pain with recent trauma.

 

Technique:  AP, lateral, bilateral oblique views of the right third digit.

 

Findings:

Arthritic degenerative changes are appreciated primarily involving the distal

interphalangeal joint with subchondral sclerosis and spurring.  Mild swelling

noted.  No obvious acute fracture or dislocation.  No periosteal reaction.  No

foreign body or subcutaneous emphysema.

 

Impression:

Mild swelling.  No obvious acute fracture dislocation.

 

 

Electronically Signed by

Clemente Trinidad MD 03/24/2020 11:06 A

## 2020-11-10 ENCOUNTER — HOSPITAL ENCOUNTER (OUTPATIENT)
Dept: HOSPITAL 53 - M SFHCCLAY | Age: 55
End: 2020-11-10
Attending: PHYSICIAN ASSISTANT
Payer: COMMERCIAL

## 2020-11-10 DIAGNOSIS — E55.9: ICD-10-CM

## 2020-11-10 DIAGNOSIS — I10: Primary | ICD-10-CM

## 2020-11-10 DIAGNOSIS — E11.69: ICD-10-CM

## 2020-11-10 DIAGNOSIS — R53.83: ICD-10-CM

## 2020-11-10 LAB
25(OH)D3 SERPL-MCNC: 43.2 NG/ML (ref 30–100)
ALBUMIN SERPL BCG-MCNC: 4.3 GM/DL (ref 3.2–5.2)
ALT SERPL W P-5'-P-CCNC: 30 U/L (ref 12–78)
BASOPHILS # BLD AUTO: 0.1 10^3/UL (ref 0–0.2)
BASOPHILS NFR BLD AUTO: 0.9 % (ref 0–1)
BILIRUB SERPL-MCNC: 1.2 MG/DL (ref 0.2–1)
BUN SERPL-MCNC: 23 MG/DL (ref 7–18)
CALCIUM SERPL-MCNC: 9.4 MG/DL (ref 8.5–10.1)
CHLORIDE SERPL-SCNC: 104 MEQ/L (ref 98–107)
CHOLEST SERPL-MCNC: 180 MG/DL (ref ?–200)
CHOLEST/HDLC SERPL: 4.86 {RATIO} (ref ?–5)
CO2 SERPL-SCNC: 27 MEQ/L (ref 21–32)
CREAT SERPL-MCNC: 1.16 MG/DL (ref 0.7–1.3)
CREAT UR-MCNC: 186 MG/DL
EOSINOPHIL # BLD AUTO: 0.2 10^3/UL (ref 0–0.5)
EOSINOPHIL NFR BLD AUTO: 3 % (ref 0–3)
EST. AVERAGE GLUCOSE BLD GHB EST-MCNC: 252 MG/DL (ref 60–110)
GFR SERPL CREATININE-BSD FRML MDRD: > 60 ML/MIN/{1.73_M2} (ref 56–?)
GLUCOSE SERPL-MCNC: 205 MG/DL (ref 70–100)
HCT VFR BLD AUTO: 44.7 % (ref 42–52)
HDLC SERPL-MCNC: 37 MG/DL (ref 40–?)
HGB BLD-MCNC: 15 G/DL (ref 13.5–17.5)
LDLC SERPL CALC-MCNC: 67 MG/DL (ref ?–100)
LYMPHOCYTES # BLD AUTO: 1.9 10^3/UL (ref 1.5–5)
LYMPHOCYTES NFR BLD AUTO: 24.4 % (ref 24–44)
MCH RBC QN AUTO: 31.3 PG (ref 27–33)
MCHC RBC AUTO-ENTMCNC: 33.6 G/DL (ref 32–36.5)
MCV RBC AUTO: 93.3 FL (ref 80–96)
MICROALBUMIN UR-MCNC: 20 MG/L
MICROALBUMIN/CREAT UR: 10.7 MCG/MG (ref 0–30)
MONOCYTES # BLD AUTO: 0.5 10^3/UL (ref 0–0.8)
MONOCYTES NFR BLD AUTO: 6.3 % (ref 0–5)
NEUTROPHILS # BLD AUTO: 5.1 10^3/UL (ref 1.5–8.5)
NEUTROPHILS NFR BLD AUTO: 64.8 % (ref 36–66)
NONHDLC SERPL-MCNC: 143 MG/DL
PLATELET # BLD AUTO: 261 10^3/UL (ref 150–450)
POTASSIUM SERPL-SCNC: 4.7 MEQ/L (ref 3.5–5.1)
PROT SERPL-MCNC: 7.3 GM/DL (ref 6.4–8.2)
RBC # BLD AUTO: 4.79 10^6/UL (ref 4.3–6.1)
SODIUM SERPL-SCNC: 139 MEQ/L (ref 136–145)
T4 FREE SERPL-MCNC: 0.84 NG/DL (ref 0.76–1.46)
TRIGL SERPL-MCNC: 380 MG/DL (ref ?–150)
TSH SERPL DL<=0.005 MIU/L-ACNC: 1.57 UIU/ML (ref 0.36–3.74)
VIT B12 SERPL-MCNC: 289 PG/ML
WBC # BLD AUTO: 7.9 10^3/UL (ref 4–10)

## 2020-11-12 LAB — FOLATE SERPL-MCNC: 16.7 NG/ML

## 2020-11-14 LAB
TESTOST SERPL-MCNC: 217 NG/DL (ref 264–916)
TESTOSTERONE.FREE+WB MFR SERPL: 43.6 % (ref 9–46)
TESTOSTERONE.FREE+WB SERPL-MCNC: 94.6 NG/DL (ref 40–250)

## 2021-04-18 ENCOUNTER — HOSPITAL ENCOUNTER (OUTPATIENT)
Dept: HOSPITAL 53 - M LABSMTC | Age: 56
End: 2021-04-18
Attending: COLON & RECTAL SURGERY
Payer: COMMERCIAL

## 2021-04-18 DIAGNOSIS — Z01.818: Primary | ICD-10-CM

## 2021-04-18 DIAGNOSIS — Z20.828: ICD-10-CM

## 2021-05-26 ENCOUNTER — HOSPITAL ENCOUNTER (OUTPATIENT)
Dept: HOSPITAL 53 - M SFHCCAPE | Age: 56
End: 2021-05-26
Attending: PHYSICIAN ASSISTANT
Payer: COMMERCIAL

## 2021-05-26 DIAGNOSIS — R79.89: ICD-10-CM

## 2021-05-26 DIAGNOSIS — E55.9: ICD-10-CM

## 2021-05-26 DIAGNOSIS — I10: ICD-10-CM

## 2021-05-26 DIAGNOSIS — E11.69: Primary | ICD-10-CM

## 2021-05-26 LAB
25(OH)D3 SERPL-MCNC: 57.4 NG/ML (ref 30–100)
ALBUMIN SERPL BCG-MCNC: 4.1 GM/DL (ref 3.2–5.2)
ALT SERPL W P-5'-P-CCNC: 27 U/L (ref 12–78)
BASOPHILS # BLD AUTO: 0.1 10^3/UL (ref 0–0.2)
BASOPHILS NFR BLD AUTO: 1 % (ref 0–1)
BILIRUB SERPL-MCNC: 0.9 MG/DL (ref 0.2–1)
BUN SERPL-MCNC: 32 MG/DL (ref 7–18)
CALCIUM SERPL-MCNC: 9.7 MG/DL (ref 8.5–10.1)
CHLORIDE SERPL-SCNC: 108 MEQ/L (ref 98–107)
CHOLEST SERPL-MCNC: 150 MG/DL (ref ?–200)
CHOLEST/HDLC SERPL: 3.95 {RATIO} (ref ?–5)
CO2 SERPL-SCNC: 24 MEQ/L (ref 21–32)
CREAT SERPL-MCNC: 1.5 MG/DL (ref 0.7–1.3)
EOSINOPHIL # BLD AUTO: 0.3 10^3/UL (ref 0–0.5)
EOSINOPHIL NFR BLD AUTO: 5.4 % (ref 0–3)
EST. AVERAGE GLUCOSE BLD GHB EST-MCNC: 189 MG/DL (ref 60–110)
FSH SERPL-ACNC: 8.2 MIU/ML (ref 1.4–18.1)
GFR SERPL CREATININE-BSD FRML MDRD: 51.7 ML/MIN/{1.73_M2} (ref 56–?)
GLUCOSE SERPL-MCNC: 165 MG/DL (ref 70–100)
HCT VFR BLD AUTO: 38.9 % (ref 42–52)
HDLC SERPL-MCNC: 38 MG/DL (ref 40–?)
HGB BLD-MCNC: 12.8 G/DL (ref 13.5–17.5)
LDLC SERPL CALC-MCNC: 71 MG/DL (ref ?–100)
LH SERPL-ACNC: 2.8 MIU/ML (ref 1.5–9.3)
LYMPHOCYTES # BLD AUTO: 1.6 10^3/UL (ref 1.5–5)
LYMPHOCYTES NFR BLD AUTO: 25.5 % (ref 24–44)
MCH RBC QN AUTO: 31 PG (ref 27–33)
MCHC RBC AUTO-ENTMCNC: 32.9 G/DL (ref 32–36.5)
MCV RBC AUTO: 94.2 FL (ref 80–96)
MONOCYTES # BLD AUTO: 0.5 10^3/UL (ref 0–0.8)
MONOCYTES NFR BLD AUTO: 7.8 % (ref 2–8)
NEUTROPHILS # BLD AUTO: 3.7 10^3/UL (ref 1.5–8.5)
NEUTROPHILS NFR BLD AUTO: 59.8 % (ref 36–66)
NONHDLC SERPL-MCNC: 112 MG/DL
PLATELET # BLD AUTO: 212 10^3/UL (ref 150–450)
POTASSIUM SERPL-SCNC: 4.9 MEQ/L (ref 3.5–5.1)
PROT SERPL-MCNC: 6.9 GM/DL (ref 6.4–8.2)
RBC # BLD AUTO: 4.13 10^6/UL (ref 4.3–6.1)
SODIUM SERPL-SCNC: 139 MEQ/L (ref 136–145)
TRIGL SERPL-MCNC: 207 MG/DL (ref ?–150)
TSH SERPL DL<=0.005 MIU/L-ACNC: 2.45 UIU/ML (ref 0.36–3.74)
WBC # BLD AUTO: 6.2 10^3/UL (ref 4–10)

## 2021-05-28 LAB
TESTOST FREE SERPL-MCNC: 5.8 PG/ML (ref 7.2–24)
TESTOST SERPL-MCNC: 174 NG/DL (ref 264–916)

## 2021-06-21 ENCOUNTER — HOSPITAL ENCOUNTER (OUTPATIENT)
Dept: HOSPITAL 53 - M SFHCCAPE | Age: 56
End: 2021-06-21
Attending: PHYSICIAN ASSISTANT
Payer: COMMERCIAL

## 2021-06-21 DIAGNOSIS — F41.9: ICD-10-CM

## 2021-06-21 DIAGNOSIS — E11.69: ICD-10-CM

## 2021-06-21 DIAGNOSIS — I10: Primary | ICD-10-CM

## 2021-06-21 DIAGNOSIS — E55.9: ICD-10-CM

## 2021-06-21 DIAGNOSIS — Z12.5: ICD-10-CM

## 2021-06-21 DIAGNOSIS — R79.89: ICD-10-CM

## 2021-06-21 LAB
25(OH)D3 SERPL-MCNC: 38.8 NG/ML (ref 30–100)
ALBUMIN SERPL BCG-MCNC: 3.9 GM/DL (ref 3.2–5.2)
ALT SERPL W P-5'-P-CCNC: 28 U/L (ref 12–78)
BASOPHILS # BLD AUTO: 0.1 10^3/UL (ref 0–0.2)
BASOPHILS NFR BLD AUTO: 1 % (ref 0–1)
BILIRUB SERPL-MCNC: 0.7 MG/DL (ref 0.2–1)
BUN SERPL-MCNC: 23 MG/DL (ref 7–18)
CALCIUM SERPL-MCNC: 9.3 MG/DL (ref 8.5–10.1)
CHLORIDE SERPL-SCNC: 104 MEQ/L (ref 98–107)
CHOLEST SERPL-MCNC: 155 MG/DL (ref ?–200)
CHOLEST/HDLC SERPL: 4.56 {RATIO} (ref ?–5)
CO2 SERPL-SCNC: 25 MEQ/L (ref 21–32)
CREAT SERPL-MCNC: 1.45 MG/DL (ref 0.7–1.3)
CREAT UR-MCNC: 108 MG/DL
EOSINOPHIL # BLD AUTO: 0.3 10^3/UL (ref 0–0.5)
EOSINOPHIL NFR BLD AUTO: 4.8 % (ref 0–3)
EST. AVERAGE GLUCOSE BLD GHB EST-MCNC: 194 MG/DL (ref 60–110)
GFR SERPL CREATININE-BSD FRML MDRD: 53.8 ML/MIN/{1.73_M2} (ref 56–?)
GLUCOSE SERPL-MCNC: 288 MG/DL (ref 70–100)
HCT VFR BLD AUTO: 39.4 % (ref 42–52)
HDLC SERPL-MCNC: 34 MG/DL (ref 40–?)
HGB BLD-MCNC: 13 G/DL (ref 13.5–17.5)
LDLC SERPL CALC-MCNC: 71 MG/DL (ref ?–100)
LYMPHOCYTES # BLD AUTO: 1.5 10^3/UL (ref 1.5–5)
LYMPHOCYTES NFR BLD AUTO: 24.8 % (ref 24–44)
MCH RBC QN AUTO: 31.2 PG (ref 27–33)
MCHC RBC AUTO-ENTMCNC: 33 G/DL (ref 32–36.5)
MCV RBC AUTO: 94.5 FL (ref 80–96)
MICROALBUMIN UR-MCNC: 14.1 MG/L
MICROALBUMIN/CREAT UR: 13 MCG/MG (ref 0–30)
MONOCYTES # BLD AUTO: 0.5 10^3/UL (ref 0–0.8)
MONOCYTES NFR BLD AUTO: 7.7 % (ref 2–8)
NEUTROPHILS # BLD AUTO: 3.8 10^3/UL (ref 1.5–8.5)
NEUTROPHILS NFR BLD AUTO: 61.1 % (ref 36–66)
NONHDLC SERPL-MCNC: 121 MG/DL
PLATELET # BLD AUTO: 234 10^3/UL (ref 150–450)
POTASSIUM SERPL-SCNC: 4.4 MEQ/L (ref 3.5–5.1)
PROT SERPL-MCNC: 6.8 GM/DL (ref 6.4–8.2)
RBC # BLD AUTO: 4.17 10^6/UL (ref 4.3–6.1)
SODIUM SERPL-SCNC: 136 MEQ/L (ref 136–145)
TRIGL SERPL-MCNC: 252 MG/DL (ref ?–150)
TSH SERPL DL<=0.005 MIU/L-ACNC: 1.09 UIU/ML (ref 0.36–3.74)
WBC # BLD AUTO: 6.2 10^3/UL (ref 4–10)

## 2021-08-25 ENCOUNTER — HOSPITAL ENCOUNTER (OUTPATIENT)
Dept: HOSPITAL 53 - M SFHCCAPE | Age: 56
End: 2021-08-25
Attending: PHYSICIAN ASSISTANT
Payer: COMMERCIAL

## 2021-08-25 DIAGNOSIS — M25.50: Primary | ICD-10-CM

## 2021-08-25 LAB
ALBUMIN SERPL BCG-MCNC: 4.1 GM/DL (ref 3.2–5.2)
ALT SERPL W P-5'-P-CCNC: 41 U/L (ref 12–78)
APPEARANCE UR: CLEAR
BACTERIA UR QL AUTO: NEGATIVE
BASOPHILS # BLD AUTO: 0.1 10^3/UL (ref 0–0.2)
BASOPHILS NFR BLD AUTO: 1.1 % (ref 0–1)
BILIRUB SERPL-MCNC: 0.8 MG/DL (ref 0.2–1)
BILIRUB UR QL STRIP.AUTO: NEGATIVE
BUN SERPL-MCNC: 20 MG/DL (ref 7–18)
CALCIUM SERPL-MCNC: 9.3 MG/DL (ref 8.5–10.1)
CHLORIDE SERPL-SCNC: 107 MEQ/L (ref 98–107)
CO2 SERPL-SCNC: 25 MEQ/L (ref 21–32)
CREAT SERPL-MCNC: 1.04 MG/DL (ref 0.7–1.3)
CRP SERPL-MCNC: 0.3 MG/DL (ref 0–0.3)
EOSINOPHIL # BLD AUTO: 0.3 10^3/UL (ref 0–0.5)
EOSINOPHIL NFR BLD AUTO: 4.4 % (ref 0–3)
ERYTHROCYTE [SEDIMENTATION RATE] IN BLOOD BY WESTERGREN METHOD: 7 MM/HR (ref 0–20)
EST. AVERAGE GLUCOSE BLD GHB EST-MCNC: 240 MG/DL (ref 60–110)
FOLATE SERPL-MCNC: 17.5 NG/ML
GFR SERPL CREATININE-BSD FRML MDRD: > 60 ML/MIN/{1.73_M2} (ref 56–?)
GLUCOSE SERPL-MCNC: 245 MG/DL (ref 70–100)
GLUCOSE UR QL STRIP.AUTO: (no result) MG/DL
HCT VFR BLD AUTO: 42.1 % (ref 42–52)
HGB BLD-MCNC: 14.1 G/DL (ref 13.5–17.5)
HGB UR QL STRIP.AUTO: NEGATIVE
KETONES UR QL STRIP.AUTO: NEGATIVE MG/DL
LEUKOCYTE ESTERASE UR QL STRIP.AUTO: NEGATIVE
LYMPHOCYTES # BLD AUTO: 1.7 10^3/UL (ref 1.5–5)
LYMPHOCYTES NFR BLD AUTO: 26.9 % (ref 24–44)
MCH RBC QN AUTO: 31.4 PG (ref 27–33)
MCHC RBC AUTO-ENTMCNC: 33.5 G/DL (ref 32–36.5)
MCV RBC AUTO: 93.8 FL (ref 80–96)
MONOCYTES # BLD AUTO: 0.4 10^3/UL (ref 0–0.8)
MONOCYTES NFR BLD AUTO: 6.7 % (ref 2–8)
NEUTROPHILS # BLD AUTO: 3.9 10^3/UL (ref 1.5–8.5)
NEUTROPHILS NFR BLD AUTO: 60.6 % (ref 36–66)
NITRITE UR QL STRIP.AUTO: NEGATIVE
PH UR STRIP.AUTO: 5 UNITS (ref 5–9)
PLATELET # BLD AUTO: 254 10^3/UL (ref 150–450)
POTASSIUM SERPL-SCNC: 4.9 MEQ/L (ref 3.5–5.1)
PROT SERPL-MCNC: 7.4 GM/DL (ref 6.4–8.2)
PROT UR QL STRIP.AUTO: NEGATIVE MG/DL
RBC # BLD AUTO: 4.49 10^6/UL (ref 4.3–6.1)
RBC # UR AUTO: 1 /HPF (ref 0–3)
SODIUM SERPL-SCNC: 137 MEQ/L (ref 136–145)
SP GR UR STRIP.AUTO: 1.03 (ref 1–1.03)
SQUAMOUS #/AREA URNS AUTO: 0 /HPF (ref 0–6)
TSH SERPL DL<=0.005 MIU/L-ACNC: 2.05 UIU/ML (ref 0.36–3.74)
UROBILINOGEN UR QL STRIP.AUTO: 0.2 MG/DL (ref 0–2)
VIT B12 SERPL-MCNC: 423 PG/ML
WBC # BLD AUTO: 6.4 10^3/UL (ref 4–10)
WBC #/AREA URNS AUTO: 0 /HPF (ref 0–3)

## 2021-08-27 LAB
B BURGDOR IGG+IGM SER-ACNC: <0.91 ISR (ref 0–0.9)
B BURGDOR IGM SER IA-ACNC: <0.8 INDEX (ref 0–0.79)
CCP IGG SERPL-ACNC: 8 UNITS (ref 0–19)

## 2022-01-04 ENCOUNTER — HOSPITAL ENCOUNTER (OUTPATIENT)
Dept: HOSPITAL 53 - M RAD | Age: 57
End: 2022-01-04
Attending: NURSE PRACTITIONER
Payer: COMMERCIAL

## 2022-01-04 DIAGNOSIS — Q61.00: Primary | ICD-10-CM

## 2022-01-04 NOTE — REP
INDICATION:

LT RENAL CYST.



COMPARISON:

There are no prior ultrasound examinations for comparison.  Previous CT scan of the

abdomen of 12/22/2020 showed a stable renal cyst on the left.



TECHNIQUE:

Real-time sonographic evaluation of the kidneys with Doppler



FINDINGS:

Multiple ultrasonographic images of the right kidney show the right kidney to measure

12.1 x 5.7 x 7 cm.  The renal cortical echotexture is unremarkable.  There are no

masses.  There is good corticomedullary differentiation.  There is no hydronephrosis.

There are no perinephric fluid collections.



Multiple ultrasonographic images of the left kidney show the left kidney to measure

11.9 x 4.9 x 6.7 cm.  The renal cortical echotexture is unremarkable.  There are no

solid masses.  In the upper pole there is a 2 cm sized anechoic structure which

exhibits posterior wall enhancement and increased through transmission.  There is good

corticomedullary differentiation.  There is no hydronephrosis.  There are no

perinephric fluid collections.





IMPRESSION:

Left renal cyst





<Electronically signed by Herman Deluca > 01/04/22 [id number]

## 2022-01-05 ENCOUNTER — HOSPITAL ENCOUNTER (OUTPATIENT)
Dept: HOSPITAL 53 - M SFHCCAPE | Age: 57
End: 2022-01-05
Attending: PHYSICIAN ASSISTANT
Payer: COMMERCIAL

## 2022-01-05 DIAGNOSIS — I10: ICD-10-CM

## 2022-01-05 DIAGNOSIS — E11.69: ICD-10-CM

## 2022-01-05 DIAGNOSIS — E78.00: ICD-10-CM

## 2022-01-05 DIAGNOSIS — E55.9: ICD-10-CM

## 2022-01-05 DIAGNOSIS — Q61.00: Primary | ICD-10-CM

## 2022-01-05 LAB
25(OH)D3 SERPL-MCNC: 30.1 NG/ML (ref 30–100)
ALBUMIN SERPL BCG-MCNC: 4.1 GM/DL (ref 3.2–5.2)
ALT SERPL W P-5'-P-CCNC: 33 U/L (ref 12–78)
BILIRUB SERPL-MCNC: 0.9 MG/DL (ref 0.2–1)
BUN SERPL-MCNC: 21 MG/DL (ref 7–18)
CALCIUM SERPL-MCNC: 9.4 MG/DL (ref 8.5–10.1)
CHLORIDE SERPL-SCNC: 101 MEQ/L (ref 98–107)
CHOLEST SERPL-MCNC: 181 MG/DL (ref ?–200)
CHOLEST/HDLC SERPL: 5.48 {RATIO} (ref ?–5)
CO2 SERPL-SCNC: 27 MEQ/L (ref 21–32)
CREAT SERPL-MCNC: 1.09 MG/DL (ref 0.7–1.3)
EST. AVERAGE GLUCOSE BLD GHB EST-MCNC: 295 MG/DL (ref 60–110)
GFR SERPL CREATININE-BSD FRML MDRD: > 60 ML/MIN/{1.73_M2} (ref 56–?)
GLUCOSE SERPL-MCNC: 384 MG/DL (ref 70–100)
HDLC SERPL-MCNC: 33 MG/DL (ref 40–?)
NONHDLC SERPL-MCNC: 148 MG/DL
POTASSIUM SERPL-SCNC: 4.1 MEQ/L (ref 3.5–5.1)
PROT SERPL-MCNC: 7 GM/DL (ref 6.4–8.2)
SODIUM SERPL-SCNC: 136 MEQ/L (ref 136–145)
TRIGL SERPL-MCNC: 431 MG/DL (ref ?–150)

## 2022-03-30 ENCOUNTER — HOSPITAL ENCOUNTER (OUTPATIENT)
Dept: HOSPITAL 53 - M SFHCCAPE | Age: 57
End: 2022-03-30
Attending: PHYSICIAN ASSISTANT
Payer: COMMERCIAL

## 2022-03-30 DIAGNOSIS — E11.65: Primary | ICD-10-CM

## 2022-03-30 LAB
ALBUMIN SERPL BCG-MCNC: 4.1 GM/DL (ref 3.2–5.2)
ALT SERPL W P-5'-P-CCNC: 39 U/L (ref 12–78)
BILIRUB SERPL-MCNC: 1.1 MG/DL (ref 0.2–1)
BUN SERPL-MCNC: 14 MG/DL (ref 7–18)
CALCIUM SERPL-MCNC: 9.1 MG/DL (ref 8.5–10.1)
CHLORIDE SERPL-SCNC: 106 MEQ/L (ref 98–107)
CHOLEST SERPL-MCNC: 145 MG/DL (ref ?–200)
CHOLEST/HDLC SERPL: 4.03 {RATIO} (ref ?–5)
CO2 SERPL-SCNC: 31 MEQ/L (ref 21–32)
CREAT SERPL-MCNC: 1.16 MG/DL (ref 0.7–1.3)
EST. AVERAGE GLUCOSE BLD GHB EST-MCNC: 206 MG/DL (ref 60–110)
GFR SERPL CREATININE-BSD FRML MDRD: > 60 ML/MIN/{1.73_M2} (ref 56–?)
GLUCOSE SERPL-MCNC: 193 MG/DL (ref 70–100)
HDLC SERPL-MCNC: 36 MG/DL (ref 40–?)
LDLC SERPL CALC-MCNC: 61 MG/DL (ref ?–100)
NONHDLC SERPL-MCNC: 109 MG/DL
POTASSIUM SERPL-SCNC: 4.3 MEQ/L (ref 3.5–5.1)
PROT SERPL-MCNC: 6.9 GM/DL (ref 6.4–8.2)
SODIUM SERPL-SCNC: 140 MEQ/L (ref 136–145)
TRIGL SERPL-MCNC: 240 MG/DL (ref ?–150)

## 2022-07-06 ENCOUNTER — HOSPITAL ENCOUNTER (OUTPATIENT)
Dept: HOSPITAL 53 - M SFHCCAPE | Age: 57
End: 2022-07-06
Attending: PHYSICIAN ASSISTANT
Payer: COMMERCIAL

## 2022-07-06 DIAGNOSIS — R10.32: ICD-10-CM

## 2022-07-06 DIAGNOSIS — Z12.5: ICD-10-CM

## 2022-07-06 DIAGNOSIS — E11.65: Primary | ICD-10-CM

## 2022-07-06 LAB
25(OH)D3 SERPL-MCNC: 46.5 NG/ML (ref 30–100)
ALBUMIN SERPL BCG-MCNC: 4 GM/DL (ref 3.2–5.2)
ALT SERPL W P-5'-P-CCNC: 35 U/L (ref 12–78)
BASOPHILS # BLD AUTO: 0.1 10^3/UL (ref 0–0.2)
BASOPHILS NFR BLD AUTO: 1 % (ref 0–1)
BILIRUB SERPL-MCNC: 1.1 MG/DL (ref 0.2–1)
BUN SERPL-MCNC: 18 MG/DL (ref 7–18)
CALCIUM SERPL-MCNC: 9.7 MG/DL (ref 8.5–10.1)
CHLORIDE SERPL-SCNC: 103 MEQ/L (ref 98–107)
CHOLEST SERPL-MCNC: 174 MG/DL (ref ?–200)
CHOLEST/HDLC SERPL: 4.35 {RATIO} (ref ?–5)
CO2 SERPL-SCNC: 27 MEQ/L (ref 21–32)
CREAT SERPL-MCNC: 1.06 MG/DL (ref 0.7–1.3)
EOSINOPHIL # BLD AUTO: 0.2 10^3/UL (ref 0–0.5)
EOSINOPHIL NFR BLD AUTO: 3.8 % (ref 0–3)
EST. AVERAGE GLUCOSE BLD GHB EST-MCNC: 246 MG/DL (ref 60–110)
GFR SERPL CREATININE-BSD FRML MDRD: > 60 ML/MIN/{1.73_M2} (ref 56–?)
GLUCOSE SERPL-MCNC: 304 MG/DL (ref 70–100)
HCT VFR BLD AUTO: 41.9 % (ref 42–52)
HDLC SERPL-MCNC: 40 MG/DL (ref 40–?)
HGB BLD-MCNC: 14.3 G/DL (ref 13.5–17.5)
LDLC SERPL CALC-MCNC: 85 MG/DL (ref ?–100)
LIPASE SERPL-CCNC: 488 U/L (ref 73–393)
LYMPHOCYTES # BLD AUTO: 1.6 10^3/UL (ref 1.5–5)
LYMPHOCYTES NFR BLD AUTO: 25.9 % (ref 24–44)
MCH RBC QN AUTO: 32 PG (ref 27–33)
MCHC RBC AUTO-ENTMCNC: 34.1 G/DL (ref 32–36.5)
MCV RBC AUTO: 93.7 FL (ref 80–96)
MONOCYTES # BLD AUTO: 0.4 10^3/UL (ref 0–0.8)
MONOCYTES NFR BLD AUTO: 6.1 % (ref 2–8)
NEUTROPHILS # BLD AUTO: 3.8 10^3/UL (ref 1.5–8.5)
NEUTROPHILS NFR BLD AUTO: 62.4 % (ref 36–66)
NONHDLC SERPL-MCNC: 134 MG/DL
PLATELET # BLD AUTO: 211 10^3/UL (ref 150–450)
POTASSIUM SERPL-SCNC: 4.5 MEQ/L (ref 3.5–5.1)
PROT SERPL-MCNC: 7.2 GM/DL (ref 6.4–8.2)
RBC # BLD AUTO: 4.47 10^6/UL (ref 4.3–6.1)
SODIUM SERPL-SCNC: 137 MEQ/L (ref 136–145)
TRIGL SERPL-MCNC: 243 MG/DL (ref ?–150)
TSH SERPL DL<=0.005 MIU/L-ACNC: 1.18 UIU/ML (ref 0.36–3.74)
WBC # BLD AUTO: 6 10^3/UL (ref 4–10)

## 2022-07-13 ENCOUNTER — HOSPITAL ENCOUNTER (OUTPATIENT)
Dept: HOSPITAL 53 - M SFHCCAPE | Age: 57
End: 2022-07-13
Attending: PHYSICIAN ASSISTANT
Payer: COMMERCIAL

## 2022-07-13 DIAGNOSIS — R74.8: Primary | ICD-10-CM

## 2022-07-13 LAB
ALBUMIN SERPL BCG-MCNC: 4.3 GM/DL (ref 3.2–5.2)
ALT SERPL W P-5'-P-CCNC: 28 U/L (ref 12–78)
AMYLASE SERPL-CCNC: 37 U/L (ref 25–115)
BASOPHILS # BLD AUTO: 0.1 10^3/UL (ref 0–0.2)
BASOPHILS NFR BLD AUTO: 1.3 % (ref 0–1)
BILIRUB SERPL-MCNC: 0.9 MG/DL (ref 0.2–1)
BUN SERPL-MCNC: 16 MG/DL (ref 7–18)
CALCIUM SERPL-MCNC: 9.8 MG/DL (ref 8.5–10.1)
CHLORIDE SERPL-SCNC: 102 MEQ/L (ref 98–107)
CO2 SERPL-SCNC: 24 MEQ/L (ref 21–32)
CREAT SERPL-MCNC: 1.09 MG/DL (ref 0.7–1.3)
EOSINOPHIL # BLD AUTO: 0.3 10^3/UL (ref 0–0.5)
EOSINOPHIL NFR BLD AUTO: 4 % (ref 0–3)
GFR SERPL CREATININE-BSD FRML MDRD: > 60 ML/MIN/{1.73_M2} (ref 56–?)
GLUCOSE SERPL-MCNC: 360 MG/DL (ref 70–100)
HCT VFR BLD AUTO: 42.9 % (ref 42–52)
HGB BLD-MCNC: 14.9 G/DL (ref 13.5–17.5)
LIPASE SERPL-CCNC: 314 U/L (ref 73–393)
LYMPHOCYTES # BLD AUTO: 1.5 10^3/UL (ref 1.5–5)
LYMPHOCYTES NFR BLD AUTO: 21.7 % (ref 24–44)
MCH RBC QN AUTO: 31.5 PG (ref 27–33)
MCHC RBC AUTO-ENTMCNC: 34.7 G/DL (ref 32–36.5)
MCV RBC AUTO: 90.7 FL (ref 80–96)
MONOCYTES # BLD AUTO: 0.4 10^3/UL (ref 0–0.8)
MONOCYTES NFR BLD AUTO: 5.8 % (ref 2–8)
NEUTROPHILS # BLD AUTO: 4.5 10^3/UL (ref 1.5–8.5)
NEUTROPHILS NFR BLD AUTO: 66.8 % (ref 36–66)
PLATELET # BLD AUTO: 230 10^3/UL (ref 150–450)
POTASSIUM SERPL-SCNC: 4.5 MEQ/L (ref 3.5–5.1)
PROT SERPL-MCNC: 7.2 GM/DL (ref 6.4–8.2)
RBC # BLD AUTO: 4.73 10^6/UL (ref 4.3–6.1)
SODIUM SERPL-SCNC: 135 MEQ/L (ref 136–145)
WBC # BLD AUTO: 6.7 10^3/UL (ref 4–10)

## 2022-08-18 ENCOUNTER — HOSPITAL ENCOUNTER (EMERGENCY)
Dept: HOSPITAL 53 - M ED | Age: 57
Discharge: HOME | End: 2022-08-18
Payer: COMMERCIAL

## 2022-08-18 VITALS — HEIGHT: 64 IN | BODY MASS INDEX: 32.9 KG/M2 | WEIGHT: 192.68 LBS

## 2022-08-18 VITALS — DIASTOLIC BLOOD PRESSURE: 85 MMHG | SYSTOLIC BLOOD PRESSURE: 166 MMHG

## 2022-08-18 DIAGNOSIS — T52.0X1A: ICD-10-CM

## 2022-08-18 DIAGNOSIS — H10.211: ICD-10-CM

## 2022-08-18 DIAGNOSIS — Z79.84: ICD-10-CM
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
## 2022-08-18 DIAGNOSIS — Z79.82: ICD-10-CM

## 2022-08-18 DIAGNOSIS — Z88.8: ICD-10-CM
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
## 2022-08-18 DIAGNOSIS — S05.01XA: Primary | ICD-10-CM
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
## 2022-08-18 DIAGNOSIS — Z79.899: ICD-10-CM

## 2022-10-13 ENCOUNTER — HOSPITAL ENCOUNTER (OUTPATIENT)
Dept: HOSPITAL 53 - M SFHCCAPE | Age: 57
End: 2022-10-13
Attending: PHYSICIAN ASSISTANT
Payer: COMMERCIAL

## 2022-10-13 DIAGNOSIS — E11.65: Primary | ICD-10-CM

## 2022-10-13 LAB
ALBUMIN SERPL BCG-MCNC: 3.9 GM/DL (ref 3.2–5.2)
ALT SERPL W P-5'-P-CCNC: 25 U/L (ref 12–78)
BILIRUB SERPL-MCNC: 0.9 MG/DL (ref 0.2–1)
BUN SERPL-MCNC: 21 MG/DL (ref 7–18)
CALCIUM SERPL-MCNC: 9.4 MG/DL (ref 8.5–10.1)
CHLORIDE SERPL-SCNC: 103 MEQ/L (ref 98–107)
CO2 SERPL-SCNC: 27 MEQ/L (ref 21–32)
CREAT SERPL-MCNC: 1.12 MG/DL (ref 0.7–1.3)
EST. AVERAGE GLUCOSE BLD GHB EST-MCNC: 269 MG/DL (ref 60–110)
GFR SERPL CREATININE-BSD FRML MDRD: > 60 ML/MIN/{1.73_M2} (ref 56–?)
GLUCOSE SERPL-MCNC: 284 MG/DL (ref 70–100)
POTASSIUM SERPL-SCNC: 4.2 MEQ/L (ref 3.5–5.1)
PROT SERPL-MCNC: 6.9 GM/DL (ref 6.4–8.2)
SODIUM SERPL-SCNC: 135 MEQ/L (ref 136–145)

## 2022-11-08 ENCOUNTER — HOSPITAL ENCOUNTER (OUTPATIENT)
Dept: HOSPITAL 53 - M SOG | Age: 57
End: 2022-11-08
Attending: ORTHOPAEDIC SURGERY
Payer: COMMERCIAL

## 2022-11-08 DIAGNOSIS — M85.632: ICD-10-CM

## 2022-11-08 DIAGNOSIS — M25.532: Primary | ICD-10-CM

## 2022-11-08 DIAGNOSIS — M19.031: ICD-10-CM

## 2022-11-08 DIAGNOSIS — M19.032: ICD-10-CM

## 2022-11-08 DIAGNOSIS — M25.531: ICD-10-CM

## 2022-11-29 ENCOUNTER — HOSPITAL ENCOUNTER (OUTPATIENT)
Dept: HOSPITAL 53 - M RAD | Age: 57
End: 2022-11-29
Attending: SURGERY
Payer: COMMERCIAL

## 2022-11-29 DIAGNOSIS — R10.32: Primary | ICD-10-CM

## 2022-11-29 DIAGNOSIS — K40.90: ICD-10-CM

## 2023-01-04 ENCOUNTER — HOSPITAL ENCOUNTER (OUTPATIENT)
Dept: HOSPITAL 53 - M SFHCCAPE | Age: 58
End: 2023-01-04
Attending: PHYSICIAN ASSISTANT
Payer: COMMERCIAL

## 2023-01-04 DIAGNOSIS — E11.69: Primary | ICD-10-CM

## 2023-01-04 DIAGNOSIS — Z12.5: ICD-10-CM

## 2023-01-04 LAB
ALBUMIN SERPL BCG-MCNC: 4.2 G/DL (ref 3.2–5.2)
ALP SERPL-CCNC: 64 U/L (ref 46–116)
ALT SERPL W P-5'-P-CCNC: 25 U/L (ref 7–40)
AST SERPL-CCNC: 17 U/L (ref ?–34)
BILIRUB SERPL-MCNC: 0.9 MG/DL (ref 0.3–1.2)
BUN SERPL-MCNC: 16 MG/DL (ref 9–23)
CALCIUM SERPL-MCNC: 9.2 MG/DL (ref 8.5–10.1)
CHLORIDE SERPL-SCNC: 103 MMOL/L (ref 98–107)
CHOLEST SERPL-MCNC: 154 MG/DL (ref ?–200)
CHOLEST/HDLC SERPL: 4.51 {RATIO} (ref ?–5)
CO2 SERPL-SCNC: 26 MMOL/L (ref 20–31)
CREAT SERPL-MCNC: 0.91 MG/DL (ref 0.7–1.3)
EST. AVERAGE GLUCOSE BLD GHB EST-MCNC: 220 MG/DL (ref 60–110)
GFR SERPL CREATININE-BSD FRML MDRD: > 60 ML/MIN/{1.73_M2} (ref 56–?)
GLUCOSE SERPL-MCNC: 206 MG/DL (ref 60–100)
HDLC SERPL-MCNC: 34.1 MG/DL (ref 40–?)
LDLC SERPL CALC-MCNC: 61.7 MG/DL (ref ?–100)
NONHDLC SERPL-MCNC: 120 MG/DL
POTASSIUM SERPL-SCNC: 4.2 MMOL/L (ref 3.5–5.1)
PROT SERPL-MCNC: 6.8 G/DL (ref 5.7–8.2)
SODIUM SERPL-SCNC: 141 MMOL/L (ref 136–145)
TRIGL SERPL-MCNC: 291 MG/DL (ref ?–150)

## 2023-01-11 ENCOUNTER — HOSPITAL ENCOUNTER (OUTPATIENT)
Dept: HOSPITAL 53 - M RAD | Age: 58
End: 2023-01-11
Attending: NURSE PRACTITIONER
Payer: COMMERCIAL

## 2023-01-11 DIAGNOSIS — N28.1: Primary | ICD-10-CM

## 2023-04-10 ENCOUNTER — HOSPITAL ENCOUNTER (OUTPATIENT)
Dept: HOSPITAL 53 - M SFHCCAPE | Age: 58
End: 2023-04-10
Attending: PHYSICIAN ASSISTANT
Payer: COMMERCIAL

## 2023-04-10 DIAGNOSIS — E11.69: Primary | ICD-10-CM

## 2023-04-10 LAB
ALBUMIN SERPL BCG-MCNC: 4.1 G/DL (ref 3.2–5.2)
ALP SERPL-CCNC: 74 U/L (ref 46–116)
ALT SERPL W P-5'-P-CCNC: 24 U/L (ref 7–40)
AST SERPL-CCNC: 13 U/L (ref ?–34)
BILIRUB SERPL-MCNC: 0.8 MG/DL (ref 0.3–1.2)
BUN SERPL-MCNC: 21 MG/DL (ref 9–23)
CALCIUM SERPL-MCNC: 9.1 MG/DL (ref 8.5–10.1)
CHLORIDE SERPL-SCNC: 104 MMOL/L (ref 98–107)
CHOLEST SERPL-MCNC: 133 MG/DL (ref ?–200)
CHOLEST/HDLC SERPL: 3.75 {RATIO} (ref ?–5)
CO2 SERPL-SCNC: 27 MMOL/L (ref 20–31)
CREAT SERPL-MCNC: 1 MG/DL (ref 0.7–1.3)
EST. AVERAGE GLUCOSE BLD GHB EST-MCNC: 214 MG/DL (ref 60–110)
GFR SERPL CREATININE-BSD FRML MDRD: > 60 ML/MIN/{1.73_M2} (ref 56–?)
GLUCOSE SERPL-MCNC: 254 MG/DL (ref 60–100)
HDLC SERPL-MCNC: 35.4 MG/DL (ref 40–?)
LDLC SERPL CALC-MCNC: 43.2 MG/DL (ref ?–100)
NONHDLC SERPL-MCNC: 97.6 MG/DL
POTASSIUM SERPL-SCNC: 4.2 MMOL/L (ref 3.5–5.1)
PROT SERPL-MCNC: 6.5 G/DL (ref 5.7–8.2)
SODIUM SERPL-SCNC: 138 MMOL/L (ref 136–145)
TRIGL SERPL-MCNC: 272 MG/DL (ref ?–150)

## 2023-05-20 ENCOUNTER — HOSPITAL ENCOUNTER (EMERGENCY)
Dept: HOSPITAL 53 - M ED | Age: 58
Discharge: HOME | End: 2023-05-20
Payer: COMMERCIAL

## 2023-05-20 VITALS — WEIGHT: 190.04 LBS | HEIGHT: 64 IN | BODY MASS INDEX: 32.44 KG/M2

## 2023-05-20 VITALS — DIASTOLIC BLOOD PRESSURE: 84 MMHG | SYSTOLIC BLOOD PRESSURE: 168 MMHG

## 2023-05-20 DIAGNOSIS — Z82.49: ICD-10-CM

## 2023-05-20 DIAGNOSIS — E11.9: ICD-10-CM

## 2023-05-20 DIAGNOSIS — E78.5: ICD-10-CM

## 2023-05-20 DIAGNOSIS — I10: ICD-10-CM

## 2023-05-20 DIAGNOSIS — Z79.82: ICD-10-CM

## 2023-05-20 DIAGNOSIS — Z79.84: ICD-10-CM

## 2023-05-20 DIAGNOSIS — R93.1: ICD-10-CM

## 2023-05-20 DIAGNOSIS — Z79.899: ICD-10-CM

## 2023-05-20 DIAGNOSIS — R09.1: Primary | ICD-10-CM

## 2023-05-20 DIAGNOSIS — Z88.8: ICD-10-CM

## 2023-05-20 LAB
ALBUMIN SERPL BCG-MCNC: 4.5 G/DL (ref 3.2–5.2)
ALP SERPL-CCNC: 65 U/L (ref 46–116)
ALT SERPL W P-5'-P-CCNC: 26 U/L (ref 7–40)
AST SERPL-CCNC: 20 U/L (ref ?–34)
BASOPHILS # BLD AUTO: 0.1 10^3/UL (ref 0–0.2)
BASOPHILS NFR BLD AUTO: 0.9 % (ref 0–1)
BILIRUB CONJ SERPL-MCNC: 0.5 MG/DL (ref ?–0.4)
BILIRUB SERPL-MCNC: 1.8 MG/DL (ref 0.3–1.2)
BUN SERPL-MCNC: 23 MG/DL (ref 9–23)
CALCIUM SERPL-MCNC: 9.4 MG/DL (ref 8.5–10.1)
CHLORIDE SERPL-SCNC: 102 MMOL/L (ref 98–107)
CK MB CFR.DF SERPL CALC: 0.9
CK MB SERPL-MCNC: < 1 NG/ML (ref ?–3.6)
CK SERPL-CCNC: 111 U/L (ref 46–171)
CO2 SERPL-SCNC: 25 MMOL/L (ref 20–31)
CREAT SERPL-MCNC: 0.97 MG/DL (ref 0.7–1.3)
EOSINOPHIL # BLD AUTO: 0.3 10^3/UL (ref 0–0.5)
EOSINOPHIL NFR BLD AUTO: 3.9 % (ref 0–3)
GFR SERPL CREATININE-BSD FRML MDRD: > 60 ML/MIN/{1.73_M2} (ref 56–?)
GLUCOSE SERPL-MCNC: 268 MG/DL (ref 60–100)
HCT VFR BLD AUTO: 48 % (ref 42–52)
HGB BLD-MCNC: 16.4 G/DL (ref 13.5–17.5)
LIPASE SERPL-CCNC: 56 U/L (ref 12–53)
LYMPHOCYTES # BLD AUTO: 1.7 10^3/UL (ref 1.5–5)
LYMPHOCYTES NFR BLD AUTO: 24.7 % (ref 24–44)
MCH RBC QN AUTO: 31.4 PG (ref 27–33)
MCHC RBC AUTO-ENTMCNC: 34.2 G/DL (ref 32–36.5)
MCV RBC AUTO: 91.8 FL (ref 80–96)
MONOCYTES # BLD AUTO: 0.4 10^3/UL (ref 0–0.8)
MONOCYTES NFR BLD AUTO: 5.4 % (ref 2–8)
NEUTROPHILS # BLD AUTO: 4.4 10^3/UL (ref 1.5–8.5)
NEUTROPHILS NFR BLD AUTO: 64.8 % (ref 36–66)
PLATELET # BLD AUTO: 219 10^3/UL (ref 150–450)
POTASSIUM SERPL-SCNC: 4.9 MMOL/L (ref 3.5–5.1)
PROT SERPL-MCNC: 7.3 G/DL (ref 5.7–8.2)
RBC # BLD AUTO: 5.23 10^6/UL (ref 4.3–6.1)
SODIUM SERPL-SCNC: 135 MMOL/L (ref 136–145)
WBC # BLD AUTO: 6.8 10^3/UL (ref 4–10)

## 2023-05-20 PROCEDURE — 94760 N-INVAS EAR/PLS OXIMETRY 1: CPT

## 2023-05-20 PROCEDURE — 80048 BASIC METABOLIC PNL TOTAL CA: CPT

## 2023-05-20 PROCEDURE — 93041 RHYTHM ECG TRACING: CPT

## 2023-05-20 PROCEDURE — 85025 COMPLETE CBC W/AUTO DIFF WBC: CPT

## 2023-05-20 PROCEDURE — 82553 CREATINE MB FRACTION: CPT

## 2023-05-20 PROCEDURE — 99285 EMERGENCY DEPT VISIT HI MDM: CPT

## 2023-05-20 PROCEDURE — 93005 ELECTROCARDIOGRAM TRACING: CPT

## 2023-05-20 PROCEDURE — 71045 X-RAY EXAM CHEST 1 VIEW: CPT

## 2023-05-20 PROCEDURE — 83690 ASSAY OF LIPASE: CPT

## 2023-05-20 PROCEDURE — 82550 ASSAY OF CK (CPK): CPT

## 2023-05-20 PROCEDURE — 71275 CT ANGIOGRAPHY CHEST: CPT

## 2023-05-20 PROCEDURE — 80076 HEPATIC FUNCTION PANEL: CPT

## 2023-08-15 ENCOUNTER — HOSPITAL ENCOUNTER (OUTPATIENT)
Dept: HOSPITAL 53 - M SFHCCAPE | Age: 58
End: 2023-08-15
Attending: PHYSICIAN ASSISTANT
Payer: COMMERCIAL

## 2023-08-15 DIAGNOSIS — E11.8: Primary | ICD-10-CM

## 2023-08-15 LAB — EST. AVERAGE GLUCOSE BLD GHB EST-MCNC: 266 MG/DL (ref 60–110)

## 2023-11-28 ENCOUNTER — HOSPITAL ENCOUNTER (OUTPATIENT)
Dept: HOSPITAL 53 - M SFHCCAPE | Age: 58
End: 2023-11-28
Payer: COMMERCIAL

## 2023-11-28 DIAGNOSIS — R03.1: Primary | ICD-10-CM

## 2023-11-28 DIAGNOSIS — E11.65: ICD-10-CM

## 2023-11-28 DIAGNOSIS — E78.1: ICD-10-CM

## 2023-11-28 LAB
ALBUMIN SERPL BCG-MCNC: 3.9 G/DL (ref 3.2–5.2)
ALP SERPL-CCNC: 74 U/L (ref 46–116)
ALT SERPL W P-5'-P-CCNC: 23 U/L (ref 7–40)
AST SERPL-CCNC: 13 U/L (ref ?–34)
BASOPHILS # BLD AUTO: 0.1 10^3/UL (ref 0–0.2)
BASOPHILS NFR BLD AUTO: 1.2 % (ref 0–1)
BILIRUB SERPL-MCNC: 0.9 MG/DL (ref 0.3–1.2)
BUN SERPL-MCNC: 20 MG/DL (ref 9–23)
CALCIUM SERPL-MCNC: 9.1 MG/DL (ref 8.5–10.1)
CHLORIDE SERPL-SCNC: 105 MMOL/L (ref 98–107)
CHOLEST SERPL-MCNC: 160 MG/DL (ref ?–200)
CHOLEST/HDLC SERPL: 4.1 {RATIO} (ref ?–5)
CO2 SERPL-SCNC: 26 MMOL/L (ref 20–31)
CREAT SERPL-MCNC: 0.89 MG/DL (ref 0.7–1.3)
EOSINOPHIL # BLD AUTO: 0.7 10^3/UL (ref 0–0.5)
EOSINOPHIL NFR BLD AUTO: 9.1 % (ref 0–3)
EST. AVERAGE GLUCOSE BLD GHB EST-MCNC: 212 MG/DL (ref 60–110)
FOLATE SERPL-MCNC: > 24 NG/ML (ref 5.4–?)
GFR SERPL CREATININE-BSD FRML MDRD: > 60 ML/MIN/{1.73_M2} (ref 56–?)
GLUCOSE SERPL-MCNC: 177 MG/DL (ref 60–100)
HCT VFR BLD AUTO: 47.7 % (ref 42–52)
HDLC SERPL-MCNC: 39 MG/DL (ref 40–?)
HGB BLD-MCNC: 16 G/DL (ref 13.5–17.5)
LDLC SERPL CALC-MCNC: 63.2 MG/DL (ref ?–100)
LYMPHOCYTES # BLD AUTO: 1.7 10^3/UL (ref 1.5–5)
LYMPHOCYTES NFR BLD AUTO: 23 % (ref 24–44)
MCH RBC QN AUTO: 31.2 PG (ref 27–33)
MCHC RBC AUTO-ENTMCNC: 33.5 G/DL (ref 32–36.5)
MCV RBC AUTO: 93 FL (ref 80–96)
MONOCYTES # BLD AUTO: 0.5 10^3/UL (ref 0–0.8)
MONOCYTES NFR BLD AUTO: 7.1 % (ref 2–8)
NEUTROPHILS # BLD AUTO: 4.4 10^3/UL (ref 1.5–8.5)
NEUTROPHILS NFR BLD AUTO: 59.2 % (ref 36–66)
NONHDLC SERPL-MCNC: 121 MG/DL
PLATELET # BLD AUTO: 211 10^3/UL (ref 150–450)
POTASSIUM SERPL-SCNC: 4.4 MMOL/L (ref 3.5–5.1)
PROT SERPL-MCNC: 6.7 G/DL (ref 5.7–8.2)
RBC # BLD AUTO: 5.13 10^6/UL (ref 4.3–6.1)
SODIUM SERPL-SCNC: 142 MMOL/L (ref 136–145)
T4 FREE SERPL-MCNC: 0.87 NG/DL (ref 0.89–1.76)
TRIGL SERPL-MCNC: 289 MG/DL (ref ?–150)
TSH SERPL DL<=0.005 MIU/L-ACNC: 2.19 UIU/ML (ref 0.55–4.78)
VIT B12 SERPL-MCNC: 393 PG/ML (ref 211–911)
WBC # BLD AUTO: 7.5 10^3/UL (ref 4–10)

## 2024-01-23 ENCOUNTER — HOSPITAL ENCOUNTER (OUTPATIENT)
Dept: HOSPITAL 53 - M RAD | Age: 59
End: 2024-01-23
Payer: COMMERCIAL

## 2024-01-23 DIAGNOSIS — R09.89: Primary | ICD-10-CM

## 2024-01-23 DIAGNOSIS — M89.8X1: ICD-10-CM

## 2024-03-14 ENCOUNTER — HOSPITAL ENCOUNTER (OUTPATIENT)
Dept: HOSPITAL 53 - M RAD | Age: 59
End: 2024-03-14
Attending: PHYSICIAN ASSISTANT
Payer: COMMERCIAL

## 2024-03-14 DIAGNOSIS — Q61.02: Primary | ICD-10-CM

## 2024-04-11 ENCOUNTER — HOSPITAL ENCOUNTER (OUTPATIENT)
Dept: HOSPITAL 53 - M LAB | Age: 59
End: 2024-04-11
Attending: PHYSICIAN ASSISTANT
Payer: COMMERCIAL

## 2024-04-11 DIAGNOSIS — I65.22: Primary | ICD-10-CM

## 2024-04-11 LAB
BUN SERPL-MCNC: 16 MG/DL (ref 9–23)
CREAT SERPL-MCNC: 0.93 MG/DL (ref 0.7–1.3)
GFR SERPL CREATININE-BSD FRML MDRD: > 60 ML/MIN/{1.73_M2} (ref 56–?)

## 2024-04-15 ENCOUNTER — HOSPITAL ENCOUNTER (OUTPATIENT)
Dept: HOSPITAL 53 - M RAD | Age: 59
End: 2024-04-15
Attending: PHYSICIAN ASSISTANT
Payer: COMMERCIAL

## 2024-04-15 DIAGNOSIS — I65.22: Primary | ICD-10-CM

## 2024-04-15 DIAGNOSIS — R59.0: ICD-10-CM

## 2024-04-15 PROCEDURE — 70496 CT ANGIOGRAPHY HEAD: CPT

## 2024-04-15 PROCEDURE — 70498 CT ANGIOGRAPHY NECK: CPT

## 2024-06-03 ENCOUNTER — HOSPITAL ENCOUNTER (OUTPATIENT)
Dept: HOSPITAL 53 - M SFHCCAPE | Age: 59
End: 2024-06-03
Payer: COMMERCIAL

## 2024-06-03 DIAGNOSIS — Q61.00: ICD-10-CM

## 2024-06-03 DIAGNOSIS — Z12.5: ICD-10-CM

## 2024-06-03 DIAGNOSIS — K21.9: ICD-10-CM

## 2024-06-03 DIAGNOSIS — E78.1: ICD-10-CM

## 2024-06-03 DIAGNOSIS — E11.69: Primary | ICD-10-CM

## 2024-06-03 LAB
ALBUMIN SERPL BCG-MCNC: 4.1 G/DL (ref 3.2–5.2)
ALP SERPL-CCNC: 76 U/L (ref 46–116)
ALT SERPL W P-5'-P-CCNC: 29 U/L (ref 7–40)
AST SERPL-CCNC: 14 U/L (ref ?–34)
BASOPHILS # BLD AUTO: 0.1 10^3/UL (ref 0–0.2)
BASOPHILS NFR BLD AUTO: 1.2 % (ref 0–1)
BILIRUB SERPL-MCNC: 0.9 MG/DL (ref 0.3–1.2)
BUN SERPL-MCNC: 18 MG/DL (ref 9–23)
CALCIUM SERPL-MCNC: 9.5 MG/DL (ref 8.5–10.1)
CHLORIDE SERPL-SCNC: 105 MMOL/L (ref 98–107)
CHOLEST SERPL-MCNC: 129 MG/DL (ref ?–200)
CHOLEST/HDLC SERPL: 3.49 {RATIO} (ref ?–5)
CO2 SERPL-SCNC: 26 MMOL/L (ref 20–31)
CREAT SERPL-MCNC: 0.83 MG/DL (ref 0.7–1.3)
CREAT UR-MCNC: 60 MG/DL
EOSINOPHIL # BLD AUTO: 0.4 10^3/UL (ref 0–0.5)
EOSINOPHIL NFR BLD AUTO: 6.1 % (ref 0–3)
EST. AVERAGE GLUCOSE BLD GHB EST-MCNC: 200 MG/DL (ref 60–110)
GFR SERPL CREATININE-BSD FRML MDRD: > 60 ML/MIN/{1.73_M2} (ref 56–?)
GLUCOSE SERPL-MCNC: 198 MG/DL (ref 60–100)
HCT VFR BLD AUTO: 46.7 % (ref 42–52)
HDLC SERPL-MCNC: 36.9 MG/DL (ref 40–?)
HGB BLD-MCNC: 15.7 G/DL (ref 13.5–17.5)
LDLC SERPL CALC-MCNC: 59.5 MG/DL (ref ?–100)
LYMPHOCYTES # BLD AUTO: 1.5 10^3/UL (ref 1.5–5)
LYMPHOCYTES NFR BLD AUTO: 22.2 % (ref 24–44)
MCH RBC QN AUTO: 31.3 PG (ref 27–33)
MCHC RBC AUTO-ENTMCNC: 33.6 G/DL (ref 32–36.5)
MCV RBC AUTO: 93 FL (ref 80–96)
MICROALBUMIN UR-MCNC: 6 MG/L
MICROALBUMIN/CREAT UR: 10 MCG/MG (ref 0–30)
MONOCYTES # BLD AUTO: 0.5 10^3/UL (ref 0–0.8)
MONOCYTES NFR BLD AUTO: 6.6 % (ref 2–8)
NEUTROPHILS # BLD AUTO: 4.4 10^3/UL (ref 1.5–8.5)
NEUTROPHILS NFR BLD AUTO: 63.6 % (ref 36–66)
NONHDLC SERPL-MCNC: 92.1 MG/DL
PLATELET # BLD AUTO: 201 10^3/UL (ref 150–450)
POTASSIUM SERPL-SCNC: 4.4 MMOL/L (ref 3.5–5.1)
PROT SERPL-MCNC: 6.6 G/DL (ref 5.7–8.2)
PSA SERPL-MCNC: 0.42 NG/ML (ref ?–4)
RBC # BLD AUTO: 5.02 10^6/UL (ref 4.3–6.1)
SODIUM SERPL-SCNC: 138 MMOL/L (ref 136–145)
T4 FREE SERPL-MCNC: 0.9 NG/DL (ref 0.89–1.76)
TRIGL SERPL-MCNC: 163 MG/DL (ref ?–150)
TSH SERPL DL<=0.005 MIU/L-ACNC: 3.29 UIU/ML (ref 0.55–4.78)
WBC # BLD AUTO: 6.9 10^3/UL (ref 4–10)

## 2024-08-06 ENCOUNTER — HOSPITAL ENCOUNTER (OUTPATIENT)
Dept: HOSPITAL 53 - M RAD | Age: 59
End: 2024-08-06
Payer: COMMERCIAL

## 2024-08-06 DIAGNOSIS — I25.10: ICD-10-CM

## 2024-08-06 DIAGNOSIS — M19.012: ICD-10-CM

## 2024-08-06 DIAGNOSIS — M25.512: Primary | ICD-10-CM

## 2024-08-06 DIAGNOSIS — I70.0: ICD-10-CM

## 2024-08-06 DIAGNOSIS — R91.1: ICD-10-CM

## 2024-08-13 ENCOUNTER — HOSPITAL ENCOUNTER (OUTPATIENT)
Dept: HOSPITAL 53 - M SFHCCAPE | Age: 59
End: 2024-08-13
Payer: COMMERCIAL

## 2024-08-13 DIAGNOSIS — M25.512: Primary | ICD-10-CM

## 2024-08-13 LAB
BASOPHILS # BLD AUTO: 0.1 10^3/UL (ref 0–0.2)
BASOPHILS NFR BLD AUTO: 0.7 % (ref 0–1)
CRP SERPL-MCNC: < 0.4 MG/DL (ref ?–1)
EOSINOPHIL # BLD AUTO: 0.3 10^3/UL (ref 0–0.5)
EOSINOPHIL NFR BLD AUTO: 3.4 % (ref 0–3)
ERYTHROCYTE [SEDIMENTATION RATE] IN BLOOD BY WESTERGREN METHOD: 6 MM/HR (ref 0–20)
HCT VFR BLD AUTO: 47.1 % (ref 42–52)
HGB BLD-MCNC: 15.9 G/DL (ref 13.5–17.5)
LYMPHOCYTES # BLD AUTO: 2.1 10^3/UL (ref 1.5–5)
LYMPHOCYTES NFR BLD AUTO: 28.5 % (ref 24–44)
MCH RBC QN AUTO: 31.3 PG (ref 27–33)
MCHC RBC AUTO-ENTMCNC: 33.8 G/DL (ref 32–36.5)
MCV RBC AUTO: 92.7 FL (ref 80–96)
MONOCYTES # BLD AUTO: 0.5 10^3/UL (ref 0–0.8)
MONOCYTES NFR BLD AUTO: 6.4 % (ref 2–8)
NEUTROPHILS # BLD AUTO: 4.5 10^3/UL (ref 1.5–8.5)
NEUTROPHILS NFR BLD AUTO: 60.9 % (ref 36–66)
PLATELET # BLD AUTO: 219 10^3/UL (ref 150–450)
RBC # BLD AUTO: 5.08 10^6/UL (ref 4.3–6.1)
URATE SERPL-MCNC: 5.7 MG/DL (ref 3.7–9.2)
WBC # BLD AUTO: 7.3 10^3/UL (ref 4–10)

## 2024-09-10 ENCOUNTER — HOSPITAL ENCOUNTER (OUTPATIENT)
Dept: HOSPITAL 53 - M SOG | Age: 59
End: 2024-09-10
Attending: PHYSICIAN ASSISTANT
Payer: COMMERCIAL

## 2024-09-10 DIAGNOSIS — M25.50: Primary | ICD-10-CM

## 2025-03-25 ENCOUNTER — HOSPITAL ENCOUNTER (OUTPATIENT)
Dept: HOSPITAL 53 - M RAD | Age: 60
End: 2025-03-25
Attending: PHYSICIAN ASSISTANT
Payer: COMMERCIAL

## 2025-03-25 DIAGNOSIS — Q61.01: Primary | ICD-10-CM
